# Patient Record
Sex: FEMALE | Race: WHITE | Employment: UNEMPLOYED | URBAN - METROPOLITAN AREA
[De-identification: names, ages, dates, MRNs, and addresses within clinical notes are randomized per-mention and may not be internally consistent; named-entity substitution may affect disease eponyms.]

---

## 2017-12-28 PROBLEM — M54.12 CERVICAL RADICULOPATHY: Status: ACTIVE | Noted: 2017-12-28

## 2018-11-05 ENCOUNTER — HOSPITAL ENCOUNTER (EMERGENCY)
Facility: CLINIC | Age: 48
Discharge: ANOTHER HOSPITAL | End: 2018-11-05
Attending: EMERGENCY MEDICINE | Admitting: EMERGENCY MEDICINE
Payer: MEDICARE

## 2018-11-05 ENCOUNTER — APPOINTMENT (OUTPATIENT)
Dept: GENERAL RADIOLOGY | Facility: CLINIC | Age: 48
End: 2018-11-05
Attending: EMERGENCY MEDICINE
Payer: MEDICARE

## 2018-11-05 ENCOUNTER — HOSPITAL ENCOUNTER (INPATIENT)
Facility: CLINIC | Age: 48
LOS: 3 days | Discharge: HOME | DRG: 625 | End: 2018-11-09
Attending: INTERNAL MEDICINE | Admitting: FAMILY MEDICINE
Payer: MEDICARE

## 2018-11-05 ENCOUNTER — APPOINTMENT (OUTPATIENT)
Dept: CT IMAGING | Facility: CLINIC | Age: 48
End: 2018-11-05
Attending: EMERGENCY MEDICINE
Payer: MEDICARE

## 2018-11-05 VITALS
RESPIRATION RATE: 24 BRPM | HEART RATE: 109 BPM | SYSTOLIC BLOOD PRESSURE: 145 MMHG | TEMPERATURE: 100.1 F | OXYGEN SATURATION: 94 % | WEIGHT: 293 LBS | DIASTOLIC BLOOD PRESSURE: 92 MMHG

## 2018-11-05 DIAGNOSIS — R13.10 DYSPHAGIA, UNSPECIFIED TYPE: ICD-10-CM

## 2018-11-05 DIAGNOSIS — J98.59 MEDIASTINAL MASS: Primary | ICD-10-CM

## 2018-11-05 DIAGNOSIS — J02.0 ACUTE STREPTOCOCCAL PHARYNGITIS: ICD-10-CM

## 2018-11-05 DIAGNOSIS — G47.33 OSA ON CPAP: ICD-10-CM

## 2018-11-05 DIAGNOSIS — A41.9 SEPSIS, DUE TO UNSPECIFIED ORGANISM: ICD-10-CM

## 2018-11-05 DIAGNOSIS — J98.59 MEDIASTINAL MASS: ICD-10-CM

## 2018-11-05 DIAGNOSIS — R13.10 ODYNOPHAGIA: ICD-10-CM

## 2018-11-05 DIAGNOSIS — R09.82 POSTNASAL DRIP: ICD-10-CM

## 2018-11-05 DIAGNOSIS — R49.0 HOARSENESS: ICD-10-CM

## 2018-11-05 LAB
ANION GAP SERPL CALCULATED.3IONS-SCNC: 6 MMOL/L (ref 3–14)
BASOPHILS # BLD AUTO: 0.1 10E9/L (ref 0–0.2)
BASOPHILS NFR BLD AUTO: 0.7 %
BUN SERPL-MCNC: 19 MG/DL (ref 7–30)
CALCIUM SERPL-MCNC: 8.7 MG/DL (ref 8.5–10.1)
CHLORIDE SERPL-SCNC: 102 MMOL/L (ref 94–109)
CO2 SERPL-SCNC: 29 MMOL/L (ref 20–32)
CREAT SERPL-MCNC: 0.85 MG/DL (ref 0.52–1.04)
DEPRECATED S PYO AG THROAT QL EIA: ABNORMAL
DIFFERENTIAL METHOD BLD: ABNORMAL
EOSINOPHIL # BLD AUTO: 0.4 10E9/L (ref 0–0.7)
EOSINOPHIL NFR BLD AUTO: 2.4 %
ERYTHROCYTE [DISTWIDTH] IN BLOOD BY AUTOMATED COUNT: 15.9 % (ref 10–15)
GFR SERPL CREATININE-BSD FRML MDRD: 71 ML/MIN/1.7M2
GLUCOSE SERPL-MCNC: 84 MG/DL (ref 70–99)
HCT VFR BLD AUTO: 42.5 % (ref 35–47)
HGB BLD-MCNC: 13.3 G/DL (ref 11.7–15.7)
IMM GRANULOCYTES # BLD: 0.3 10E9/L (ref 0–0.4)
IMM GRANULOCYTES NFR BLD: 2.1 %
LYMPHOCYTES # BLD AUTO: 2.5 10E9/L (ref 0.8–5.3)
LYMPHOCYTES NFR BLD AUTO: 15.7 %
MCH RBC QN AUTO: 28.2 PG (ref 26.5–33)
MCHC RBC AUTO-ENTMCNC: 31.3 G/DL (ref 31.5–36.5)
MCV RBC AUTO: 90 FL (ref 78–100)
MONOCYTES # BLD AUTO: 1.2 10E9/L (ref 0–1.3)
MONOCYTES NFR BLD AUTO: 7.2 %
NEUTROPHILS # BLD AUTO: 11.5 10E9/L (ref 1.6–8.3)
NEUTROPHILS NFR BLD AUTO: 71.9 %
NRBC # BLD AUTO: 0 10*3/UL
NRBC BLD AUTO-RTO: 0 /100
PLATELET # BLD AUTO: 330 10E9/L (ref 150–450)
POTASSIUM SERPL-SCNC: 3.9 MMOL/L (ref 3.4–5.3)
RBC # BLD AUTO: 4.72 10E12/L (ref 3.8–5.2)
SODIUM SERPL-SCNC: 137 MMOL/L (ref 133–144)
SPECIMEN SOURCE: ABNORMAL
WBC # BLD AUTO: 16 10E9/L (ref 4–11)

## 2018-11-05 PROCEDURE — 25000128 H RX IP 250 OP 636: Performed by: EMERGENCY MEDICINE

## 2018-11-05 PROCEDURE — 85025 COMPLETE CBC W/AUTO DIFF WBC: CPT | Performed by: EMERGENCY MEDICINE

## 2018-11-05 PROCEDURE — 80048 BASIC METABOLIC PNL TOTAL CA: CPT | Performed by: EMERGENCY MEDICINE

## 2018-11-05 PROCEDURE — A9270 NON-COVERED ITEM OR SERVICE: HCPCS | Mod: GY | Performed by: EMERGENCY MEDICINE

## 2018-11-05 PROCEDURE — 99285 EMERGENCY DEPT VISIT HI MDM: CPT | Mod: 25

## 2018-11-05 PROCEDURE — 71046 X-RAY EXAM CHEST 2 VIEWS: CPT

## 2018-11-05 PROCEDURE — 87880 STREP A ASSAY W/OPTIC: CPT | Performed by: EMERGENCY MEDICINE

## 2018-11-05 PROCEDURE — 96361 HYDRATE IV INFUSION ADD-ON: CPT

## 2018-11-05 PROCEDURE — 71260 CT THORAX DX C+: CPT

## 2018-11-05 PROCEDURE — 25000132 ZZH RX MED GY IP 250 OP 250 PS 637: Mod: GY | Performed by: EMERGENCY MEDICINE

## 2018-11-05 PROCEDURE — 96375 TX/PRO/DX INJ NEW DRUG ADDON: CPT

## 2018-11-05 PROCEDURE — 96365 THER/PROPH/DIAG IV INF INIT: CPT | Mod: 59

## 2018-11-05 RX ORDER — IOPAMIDOL 755 MG/ML
500 INJECTION, SOLUTION INTRAVASCULAR ONCE
Status: COMPLETED | OUTPATIENT
Start: 2018-11-05 | End: 2018-11-05

## 2018-11-05 RX ORDER — KETOROLAC TROMETHAMINE 15 MG/ML
15 INJECTION, SOLUTION INTRAMUSCULAR; INTRAVENOUS ONCE
Status: COMPLETED | OUTPATIENT
Start: 2018-11-05 | End: 2018-11-05

## 2018-11-05 RX ORDER — DEXAMETHASONE SODIUM PHOSPHATE 10 MG/ML
10 INJECTION, SOLUTION INTRAMUSCULAR; INTRAVENOUS ONCE
Status: COMPLETED | OUTPATIENT
Start: 2018-11-05 | End: 2018-11-05

## 2018-11-05 RX ORDER — AMOXICILLIN 500 MG/1
1000 CAPSULE ORAL ONCE
Status: COMPLETED | OUTPATIENT
Start: 2018-11-05 | End: 2018-11-05

## 2018-11-05 RX ADMIN — IOPAMIDOL 80 ML: 755 INJECTION, SOLUTION INTRAVENOUS at 17:31

## 2018-11-05 RX ADMIN — AMOXICILLIN 1000 MG: 500 CAPSULE ORAL at 16:34

## 2018-11-05 RX ADMIN — KETOROLAC TROMETHAMINE 15 MG: 15 INJECTION, SOLUTION INTRAMUSCULAR; INTRAVENOUS at 14:39

## 2018-11-05 RX ADMIN — TAZOBACTAM SODIUM AND PIPERACILLIN SODIUM 4.5 G: 500; 4 INJECTION, SOLUTION INTRAVENOUS at 19:15

## 2018-11-05 RX ADMIN — DEXAMETHASONE SODIUM PHOSPHATE 10 MG: 10 INJECTION, SOLUTION INTRAMUSCULAR; INTRAVENOUS at 14:39

## 2018-11-05 RX ADMIN — VANCOMYCIN HYDROCHLORIDE 3000 MG: 1 INJECTION, POWDER, LYOPHILIZED, FOR SOLUTION INTRAVENOUS at 19:47

## 2018-11-05 RX ADMIN — SODIUM CHLORIDE 1000 ML: 9 INJECTION, SOLUTION INTRAVENOUS at 14:40

## 2018-11-05 ASSESSMENT — ENCOUNTER SYMPTOMS
COUGH: 1
HEMATURIA: 0
NECK PAIN: 1
VOICE CHANGE: 1
SHORTNESS OF BREATH: 1
FEVER: 1
TROUBLE SWALLOWING: 1
DYSURIA: 0
SORE THROAT: 1
DIFFICULTY URINATING: 0
HEADACHES: 1
FREQUENCY: 0

## 2018-11-05 NOTE — ED NOTES
Patient able to tolerate oral medications but was having some issues drinking, she stated liquids are a bit tough but solids are fine, patient began coughing after drinking water

## 2018-11-05 NOTE — IP AVS SNAPSHOT
[address] [address]    Phone:  [phone]                                       After Visit Summary   11/5/2018    Carol Hurst    MRN: [id number]           After Visit Summary Signature Page     I have received my discharge instructions, and my questions have been answered. I have discussed any challenges I see with this plan with the nurse or doctor.    ..........................................................................................................................................  Patient/Patient Representative Signature      ..........................................................................................................................................  Patient Representative Print Name and Relationship to Patient    ..................................................               ................................................  Date                                   Time    ..........................................................................................................................................  Reviewed by Signature/Title    ...................................................              ..............................................  Date                                               Time          22EPIC Rev 08/18

## 2018-11-05 NOTE — ED TRIAGE NOTES
Patient presents to the ED reporting a sore throat and headache. Reports some difficulty swallowing. Denies SOB.

## 2018-11-05 NOTE — ED PROVIDER NOTES
History     Chief Complaint:  Pharyngitis and Headache    HPI   Carol Hurst is a 47 year old female who presents to the emergency department today with pharyngitis and headache. The patient reports sore throat, headache, difficulty swallowing, and has lost her voice. She reports cough, but also that she has mucus in her throat that makes it difficult to cough. Patient also has a fever of 100.1. She also reports neck pain and mild shortness of breath. She rates her pain as 6/10. She denies dysuria, hematuria, frequency, or difficulty urinating.     Allergies:  Ace Inhibitors  Haldol [Haloperidol]  Serotonin Reuptake Inhibitors  Thorazine [Chlorpromazine]    Medications:    Amlodipine  KCl  MVI      Past Medical History:    High blood pressure  Obesity      Past Surgical History:    History reviewed. No pertinent past surgical history.    Family History:    Noncontributory.    Social History:  The patient presents by herself.  Traveling by self.  Competitive eater.     Review of Systems   Constitutional: Positive for fever.   HENT: Positive for sore throat, trouble swallowing and voice change.    Respiratory: Positive for cough and shortness of breath.    Genitourinary: Negative for difficulty urinating, dysuria, frequency and hematuria.   Musculoskeletal: Positive for neck pain.   Neurological: Positive for headaches.   All other systems reviewed and are negative.    Physical Exam     Patient Vitals for the past 24 hrs:   BP Temp Temp src Pulse Resp SpO2   11/05/18 1530 151/85 - - - - 96 %   11/05/18 1515 152/81 - - - - 98 %   11/05/18 1445 (!) 145/96 - - - - 100 %   11/05/18 1419 - - - - - 100 %   11/05/18 1154 (!) 134/94 100.1  F (37.8  C) Temporal 109 24 96 %      Physical Exam  Nursing note and vitals reviewed.  Constitutional: Cooperative.   HENT:   Mouth/Throat: Moist mucous membranes. Posterior pharyngeal erythema.   Eyes: EOMI, nonicteric sclera  Cardiovascular: tachycardic, regular rhythm, no  murmurs, rubs, or gallops  Pulmonary/Chest: Increased effort. Only able to speak in incomplete sentences. Breath sounds normal. No stridor. No wheezes. No rales.   Abdominal: Soft. Nontender, nondistended, no guarding or rigidity. BS present.   Musculoskeletal: Normal range of motion.   Neurological: Alert. Moves all extremities spontaneously.   Skin: Skin is warm and dry. No rash noted.   Psychiatric: Normal mood and affect.         Emergency Department Course   Imaging:  Radiology findings were communicated with the patient who voiced understanding of the findings.  Chest XR, PA & LAT   IMPRESSION: Left superior mediastinal mass displacing and narrowing the trachea.  Report per radiology      Chest CT  1. Large left mediastinal mass that is ill-defined and immediately  adjacent to the esophagus measuring approximate 5.0 x 3.0 x 2.7 cm in  size. Differential diagnosis includes lymph node mass, esophageal  mass, or complex fluid collection/abscess.  2. Lungs are clear. No pleural or pericardial fluid.  Report per radiology      Laboratory:  Laboratory findings were communicated with the patient who voiced understanding of the findings.  CBC: AWNL (WBC 16.0(H), HGB 13.3, )  BMP: AWNL (Creatinine 0.85)   Rapid Strep Test: positive   Strep Culture: Pending     Interventions:  Medications   0.9% sodium chloride BOLUS (not administered)   piperacillin-tazobactam (ZOSYN) intermittent infusion 4.5 g (not administered)   vancomycin (VANCOCIN) 3,000 mg in sodium chloride 0.9 % 500 mL intermittent infusion (not administered)   dexamethasone PF (DECADRON) injection 10 mg (10 mg Intravenous Given 11/5/18 1439)   0.9% sodium chloride BOLUS (0 mLs Intravenous Stopped 11/5/18 1711)   ketorolac (TORADOL) injection 15 mg (15 mg Intravenous Given 11/5/18 1439)   amoxicillin (AMOXIL) capsule 1,000 mg (1,000 mg Oral Given 11/5/18 1634)   iopamidol (ISOVUE-370) solution 500 mL (80 mLs Intravenous Given 11/5/18 1731)         Emergency Department Course:  Nursing notes and vitals reviewed.  1325: I performed an exam of the patient as documented above.   IV was inserted and blood was drawn for laboratory testing, results above.  The patient was sent for a Chest XR and Chest CT while in the emergency department, results above.   1620: I spoke with Dr. Mercer of the Thoracic Surgery service regarding patient's presentation, findings, and plan of care.   1756: Rediscussed with Dr. Mercer. Given CT reading - recommending transfer to [place].   I personally reviewed the laboratory and imaging results with the Patient and answered all related questions prior to transfer.     Impression & Plan    Medical Decision Making:  Patient presents with multiple complaints including sore throat, fever, hoarse voice.  Workup was notable for leukocytosis, positive rapid strep, as well as a mediastinal mass noted on chest x-ray.  Follow-up CT performed which is suggestive of abscess versus lymph node mass versus esophageal mass.  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  Patient initially received amoxicillin when rapid strep became positive, this was later broadened to vancomycin and Zosyn.  Discussed with thoracic surgery who recommended transfer to the [place] for definitive care.  Notably, patient had initially been quite insistent on leaving [facility] and returning to [place] where she is from.  I had several long discussions with her and with the help of additional staff, was able to encourage her to stay.  Dr. Zavala from the [place] accepts for transfer.    Diagnosis:    ICD-10-CM    1. Mediastinal mass J98.59     likely abscess   2. Sepsis, due to unspecified organism (H) A41.9    3. Acute streptococcal pharyngitis J02.0      Disposition:  Transferred to [place].     Scribe Disclosure:  Maria G ROWELL, am serving as a  scribe at 1:25 PM on 11/5/2018 to document services personally performed by Pool Lassiter MD based on my observations and the provider's statements to me.    11/5/2018   [facility] EMERGENCY DEPARTMENT       Pool Lassiter MD  11/05/18 1957

## 2018-11-05 NOTE — LETTER
Transition Communication Hand-off for Care Transitions to Next Level of Care Provider    Name: Carol Hurst  : 1970  MRN #: [id number]  Primary Care Provider: Elizabeth Willingham  Primary Clinic: [address], [facility] [address]     Reason for Hospitalization:    mediastinal mass  obstructed trachea  Esophageal Perforation     Admit Date/Time: 2018 10:22 PM  Discharge Date: 2018    Payor Source: Payor: MEDICARE / Plan: MEDICARE / Product Type: Medicare /         Reason for Communication Hand-off Referral: Multiple providers/specialties  Other Patient will need assistance with follow up Speciality Physician Services   Patient needs arrangements with Speech Therapy in [place]    Discharge Plan: Patient is driving home from [facility], leaving today.  Follow-up plan:  Initial appointments are arranged, not available in EPIC    Any outstanding tests or procedures:        Referrals     Future Labs/Procedures    Speech Therapy Referral     Process Instructions:    *This therapy referral will be filtered to a centralized scheduling office at [facility] and the patient will receive a call to schedule an appointment at a [place] location most convenient for them. *    Comments:    If you have not heard from the scheduling office within 2 business days, please call [phone] for all locations, with the exception of [place], please call [phone] and [place], please call [phone].    Please be aware that coverage of these services is subject to the terms and limitations of your health insurance plan.  Call member services at your health plan with any benefit or coverage questions.          Key Recommendations:   Please review discharge after visit summary    Nathalia Garner RN, BSN, PHN  Medicine Care Coordinator  [facility], [facility]  Desk Phone: [phone]    AVS/Discharge Summary is the source of truth; this is a  helpful guide for improved communication of patient story

## 2018-11-05 NOTE — IP AVS SNAPSHOT
MRN:[id number]                      After Visit Summary   11/5/2018    Carol Hurst    MRN: [id number]           Thank you!     Thank you for choosing [facility] for your care. Our goal is always to provide you with excellent care. Hearing back from our patients is one way we can continue to improve our services. Please take a few minutes to complete the written survey that you may receive in the mail after you visit with us. Thank you!        Patient Information     Date Of Birth          1970        Designated Caregiver       Most Recent Value    Caregiver    Will someone help with your care after discharge? no      About your hospital stay     You were admitted on:  November 5, 2018 You last received care in the:  [address] [facility]    You were discharged on:  November 9, 2018        Reason for your hospital stay       You were hospitalized after you developed a hoarse voice and some difficulty swallowing liquids. We did some imaging which revealed a mass in your neck, which is likely thyroid in origin. We recommend follow up with your hematologist and primary doctor on return to [place] for further evaluation and treatment.                  Who to Call     For medical emergencies, please call 911.  For non-urgent questions about your medical care, please call your primary care provider or clinic, [phone]  For questions related to your surgery, please call your surgery clinic        Attending Provider     Provider Specialty    Bia Zavala DO Internal Medicine    Ankush Fuller MD Family Practice       Primary Care Provider Office Phone # Fax #    Elizabeth Willingham [phone]       When to contact your care team       Call your primary doctor if you have any of the following:  increased shortness of breath, increased swelling or increased pain.                  After Care Instructions     Activity       Your activity upon discharge: activity as  tolerated            Diet       Follow this diet upon discharge: Orders Placed This Encounter        High Kcal/High Protein Diet, ADULT                  Follow-up Appointments     Follow Up and recommended labs and tests       Follow up with primary care provider, Elizabeth Willingham, within 7 days regarding new diagnosis.  No follow up labs or test are needed.  Follow up with your hematologist to follow up on your pathology report and for further treatment.                  Additional Services     Speech Therapy Referral       If you have not heard from the scheduling office within 2 business days, please call [phone] for all locations, with the exception of [place], please call [phone] and [place], please call [phone].    Please be aware that coverage of these services is subject to the terms and limitations of your health insurance plan.  Call member services at your health plan with any benefit or coverage questions.                  Additional Information     If you use hormonal birth control (such as the pill, patch, ring or implants): You'll need a second form of birth control for 7 days (condoms, a diaphragm or contraceptive foam). While in the hospital, you received a medicine called Bridion. Your normal birth control will not work as well for a week after taking this medicine.          Pending Results     Date and Time Order Name Status Description    11/6/2018 1300 Surgical pathology exam Preliminary     11/6/2018 1236 Fungus Culture, non-blood Preliminary     11/6/2018 1236 Anaerobic bacterial culture Preliminary             Statement of Approval     Ordered          11/09/18 1020  I have reviewed and agree with all the recommendations and orders detailed in this document.  EFFECTIVE NOW     Approved and electronically signed by:  Chris Fitzgerald MD             Admission Information     Date & Time Provider Department Dept. Phone    11/5/2018 Ankush Fuller MD [address] [facility]  "[place] [phone]      Your Vitals Were     Blood Pressure Pulse Temperature Respirations Height Weight    168/98 (BP Location: Left arm) 100 98.8  F (37.1  C) (Oral) 18 1.803 m (5' 11\") 200 kg (441 lb)    Pulse Oximetry BMI (Body Mass Index)                94% 61.51 kg/m2          [facility] Information     [facility] lets you send messages to your doctor, view your test results, renew your prescriptions, schedule appointments and more. To sign up, go to [email or web address] . Click on \"Log in\" on the left side of the screen, which will take you to the Welcome page. Then click on \"Sign up Now\" on the right side of the page.     You will be asked to enter the access code listed below, as well as some personal information. Please follow the directions to create your username and password.     Your access code is: [email or web address]  Expires: 2019 12:09 PM     Your access code will  in 90 days. If you need help or a new code, please call your [facility] clinic or [phone].        Care EveryWhere ID     This is your Care EveryWhere ID. This could be used by other organizations to access your [facility] medical records  [id number]        Equal Access to Services     JOELLEN PEREZ AH: Austen nava Soartem, waaxda luqadaha, qaybta kaalmada adeegyada, monse awad. So [facility] [phone].    ATENCIÓN: Si habla español, tiene a gil disposición servicios gratuitos de asistencia lingüística. Llame al [phone].    We comply with applicable federal civil rights laws and [place] laws. We do not discriminate on the basis of race, color, national origin, age, disability, sex, sexual orientation, or gender identity.               Review of your medicines      START taking        Dose / Directions    Acidophilus Lactobacillus Caps        Dose:  1 capsule   Take 1 capsule by mouth 2 times daily (before meals)   Quantity:  60 capsule   Refills:  0       fluticasone 50 MCG/ACT spray "   Commonly known as:  FLONASE   Used for:  Dysphagia, unspecified type, Postnasal drip        Dose:  1-2 spray   Spray 1-2 sprays into both nostrils daily   Quantity:  1 Bottle   Refills:  11       STARCH-MALTO DEXTRIN Powd   Commonly known as:  [employer] INSTANT FOOD THICKENER   Used for:  Dysphagia, unspecified type        Dose:  1 packet   Take 1 packet by mouth as needed (liquid thickening)   Quantity:  1020 g   Refills:  0         CONTINUE these medicines which have NOT CHANGED        Dose / Directions    * allopurinol 100 MG tablet   Commonly known as:  ZYLOPRIM        Dose:  200 mg   Take 200 mg by mouth daily Take with 300 mg tablet.   Refills:  0       * allopurinol 300 MG tablet   Commonly known as:  ZYLOPRIM        Dose:  300 mg   Take 300 mg by mouth daily Take with two 100 mg tablets.   Refills:  0       amLODIPine 5 MG tablet   Commonly known as:  NORVASC        Dose:  5 mg   Take 5 mg by mouth daily   Refills:  0       aspirin 325 MG tablet        Dose:  325 mg   Take 325 mg by mouth daily   Refills:  0       cholecalciferol 2000 units Caps        Dose:  1 capsule   Take 1 capsule by mouth daily   Refills:  0       naproxen 500 MG tablet   Commonly known as:  NAPROSYN        Dose:  500 mg   Take 500 mg by mouth 2 times daily as needed for moderate pain   Refills:  0       potassium citrate 15 MEQ (1620 MG) Tbcr        Dose:  1 tablet   Take 1 tablet by mouth 2 times daily   Refills:  0       * Notice:  This list has 2 medication(s) that are the same as other medications prescribed for you. Read the directions carefully, and ask your doctor or other care provider to review them with you.         Where to get your medicines      These medications were sent to [facility] - [place], [place] - [address]  [address] [address]     Phone:  [phone]     Acidophilus Lactobacillus Caps    fluticasone 50 MCG/ACT spray    STARCH-MALTO DEXTRIN Powd                 Protect others around you: Learn how to safely use, store and throw away your medicines at www.disposemymeds.org.             Medication List: This is a list of all your medications and when to take them. Check marks below indicate your daily home schedule. Keep this list as a reference.      Medications           Morning Afternoon Evening Bedtime As Needed    Acidophilus Lactobacillus Caps   Take 1 capsule by mouth 2 times daily (before meals)                                * allopurinol 100 MG tablet   Commonly known as:  ZYLOPRIM   Take 200 mg by mouth daily Take with 300 mg tablet.   Last time this was given:  11/9/2018 10:04 AM                                * allopurinol 300 MG tablet   Commonly known as:  ZYLOPRIM   Take 300 mg by mouth daily Take with two 100 mg tablets.   Last time this was given:  11/9/2018 10:04 AM                                amLODIPine 5 MG tablet   Commonly known as:  NORVASC   Take 5 mg by mouth daily   Last time this was given:  5 mg on 11/9/2018  9:09 AM                                aspirin 325 MG tablet   Take 325 mg by mouth daily   Last time this was given:  325 mg on 11/9/2018  9:09 AM                                cholecalciferol 2000 units Caps   Take 1 capsule by mouth daily                                fluticasone 50 MCG/ACT spray   Commonly known as:  FLONASE   Spray 1-2 sprays into both nostrils daily                                naproxen 500 MG tablet   Commonly known as:  NAPROSYN   Take 500 mg by mouth 2 times daily as needed for moderate pain                                potassium citrate 15 MEQ (1620 MG) Tbcr   Take 1 tablet by mouth 2 times daily                                STARCH-MALTO DEXTRIN Powd   Commonly known as:  [employer] INSTANT FOOD THICKENER   Take 1 packet by mouth as needed (liquid thickening)                                * Notice:  This list has 2 medication(s) that are the same as other medications prescribed for you. Read the directions  carefully, and ask your doctor or other care provider to review them with you.

## 2018-11-06 ENCOUNTER — ANESTHESIA EVENT (OUTPATIENT)
Dept: SURGERY | Facility: CLINIC | Age: 48
DRG: 625 | End: 2018-11-06
Payer: MEDICARE

## 2018-11-06 ENCOUNTER — ANESTHESIA (OUTPATIENT)
Dept: SURGERY | Facility: CLINIC | Age: 48
DRG: 625 | End: 2018-11-06
Payer: MEDICARE

## 2018-11-06 PROBLEM — J98.59 MEDIASTINAL MASS: Status: ACTIVE | Noted: 2018-11-06

## 2018-11-06 LAB
ABO + RH BLD: NORMAL
ABO + RH BLD: NORMAL
ANION GAP SERPL CALCULATED.3IONS-SCNC: 11 MMOL/L (ref 3–14)
BASOPHILS # BLD AUTO: 0 10E9/L (ref 0–0.2)
BASOPHILS NFR BLD AUTO: 0.2 %
BLD GP AB SCN SERPL QL: NORMAL
BLOOD BANK CMNT PATIENT-IMP: NORMAL
BUN SERPL-MCNC: 21 MG/DL (ref 7–30)
CALCIUM SERPL-MCNC: 8.7 MG/DL (ref 8.5–10.1)
CHLORIDE SERPL-SCNC: 104 MMOL/L (ref 94–109)
CO2 SERPL-SCNC: 23 MMOL/L (ref 20–32)
CREAT SERPL-MCNC: 0.86 MG/DL (ref 0.52–1.04)
DIFFERENTIAL METHOD BLD: ABNORMAL
EOSINOPHIL # BLD AUTO: 0 10E9/L (ref 0–0.7)
EOSINOPHIL NFR BLD AUTO: 0 %
ERYTHROCYTE [DISTWIDTH] IN BLOOD BY AUTOMATED COUNT: 16 % (ref 10–15)
GFR SERPL CREATININE-BSD FRML MDRD: 70 ML/MIN/1.7M2
GLUCOSE BLDC GLUCOMTR-MCNC: 110 MG/DL (ref 70–99)
GLUCOSE SERPL-MCNC: 154 MG/DL (ref 70–99)
HCT VFR BLD AUTO: 42.5 % (ref 35–47)
HGB BLD-MCNC: 13.1 G/DL (ref 11.7–15.7)
IMM GRANULOCYTES # BLD: 0.2 10E9/L (ref 0–0.4)
IMM GRANULOCYTES NFR BLD: 1 %
LACTATE BLD-SCNC: 1.3 MMOL/L (ref 0.7–2)
LYMPHOCYTES # BLD AUTO: 1.4 10E9/L (ref 0.8–5.3)
LYMPHOCYTES NFR BLD AUTO: 8.8 %
MCH RBC QN AUTO: 28.4 PG (ref 26.5–33)
MCHC RBC AUTO-ENTMCNC: 30.8 G/DL (ref 31.5–36.5)
MCV RBC AUTO: 92 FL (ref 78–100)
MONOCYTES # BLD AUTO: 0.4 10E9/L (ref 0–1.3)
MONOCYTES NFR BLD AUTO: 2.3 %
NEUTROPHILS # BLD AUTO: 14 10E9/L (ref 1.6–8.3)
NEUTROPHILS NFR BLD AUTO: 87.7 %
NRBC # BLD AUTO: 0 10*3/UL
NRBC BLD AUTO-RTO: 0 /100
PLATELET # BLD AUTO: 340 10E9/L (ref 150–450)
POTASSIUM SERPL-SCNC: 4.5 MMOL/L (ref 3.4–5.3)
RBC # BLD AUTO: 4.61 10E12/L (ref 3.8–5.2)
SODIUM SERPL-SCNC: 138 MMOL/L (ref 133–144)
SPECIMEN EXP DATE BLD: NORMAL
WBC # BLD AUTO: 16 10E9/L (ref 4–11)

## 2018-11-06 PROCEDURE — 88305 TISSUE EXAM BY PATHOLOGIST: CPT | Performed by: THORACIC SURGERY (CARDIOTHORACIC VASCULAR SURGERY)

## 2018-11-06 PROCEDURE — 00000146 ZZHCL STATISTIC GLUCOSE BY METER IP

## 2018-11-06 PROCEDURE — 85025 COMPLETE CBC W/AUTO DIFF WBC: CPT | Performed by: INTERNAL MEDICINE

## 2018-11-06 PROCEDURE — 25000128 H RX IP 250 OP 636: Performed by: NURSE ANESTHETIST, CERTIFIED REGISTERED

## 2018-11-06 PROCEDURE — 36000062 ZZH SURGERY LEVEL 4 1ST 30 MIN - UMMC: Performed by: THORACIC SURGERY (CARDIOTHORACIC VASCULAR SURGERY)

## 2018-11-06 PROCEDURE — 0BJ08ZZ INSPECTION OF TRACHEOBRONCHIAL TREE, VIA NATURAL OR ARTIFICIAL OPENING ENDOSCOPIC: ICD-10-PCS | Performed by: THORACIC SURGERY (CARDIOTHORACIC VASCULAR SURGERY)

## 2018-11-06 PROCEDURE — 71000015 ZZH RECOVERY PHASE 1 LEVEL 2 EA ADDTL HR: Performed by: THORACIC SURGERY (CARDIOTHORACIC VASCULAR SURGERY)

## 2018-11-06 PROCEDURE — 87070 CULTURE OTHR SPECIMN AEROBIC: CPT | Performed by: THORACIC SURGERY (CARDIOTHORACIC VASCULAR SURGERY)

## 2018-11-06 PROCEDURE — 25000565 ZZH ISOFLURANE, EA 15 MIN: Performed by: THORACIC SURGERY (CARDIOTHORACIC VASCULAR SURGERY)

## 2018-11-06 PROCEDURE — 87077 CULTURE AEROBIC IDENTIFY: CPT | Performed by: THORACIC SURGERY (CARDIOTHORACIC VASCULAR SURGERY)

## 2018-11-06 PROCEDURE — 12000006 ZZH R&B IMCU INTERMEDIATE UMMC

## 2018-11-06 PROCEDURE — 25000128 H RX IP 250 OP 636: Performed by: STUDENT IN AN ORGANIZED HEALTH CARE EDUCATION/TRAINING PROGRAM

## 2018-11-06 PROCEDURE — C1894 INTRO/SHEATH, NON-LASER: HCPCS | Performed by: THORACIC SURGERY (CARDIOTHORACIC VASCULAR SURGERY)

## 2018-11-06 PROCEDURE — 0GBJ0ZX EXCISION OF THYROID GLAND ISTHMUS, OPEN APPROACH, DIAGNOSTIC: ICD-10-PCS | Performed by: THORACIC SURGERY (CARDIOTHORACIC VASCULAR SURGERY)

## 2018-11-06 PROCEDURE — 86900 BLOOD TYPING SEROLOGIC ABO: CPT | Performed by: ANESTHESIOLOGY

## 2018-11-06 PROCEDURE — 88342 IMHCHEM/IMCYTCHM 1ST ANTB: CPT | Performed by: THORACIC SURGERY (CARDIOTHORACIC VASCULAR SURGERY)

## 2018-11-06 PROCEDURE — 36415 COLL VENOUS BLD VENIPUNCTURE: CPT | Performed by: INTERNAL MEDICINE

## 2018-11-06 PROCEDURE — 87102 FUNGUS ISOLATION CULTURE: CPT | Performed by: THORACIC SURGERY (CARDIOTHORACIC VASCULAR SURGERY)

## 2018-11-06 PROCEDURE — 25000128 H RX IP 250 OP 636: Performed by: ANESTHESIOLOGY

## 2018-11-06 PROCEDURE — 87076 CULTURE ANAEROBE IDENT EACH: CPT | Performed by: THORACIC SURGERY (CARDIOTHORACIC VASCULAR SURGERY)

## 2018-11-06 PROCEDURE — 80048 BASIC METABOLIC PNL TOTAL CA: CPT | Performed by: FAMILY MEDICINE

## 2018-11-06 PROCEDURE — 86901 BLOOD TYPING SEROLOGIC RH(D): CPT | Performed by: ANESTHESIOLOGY

## 2018-11-06 PROCEDURE — 25000132 ZZH RX MED GY IP 250 OP 250 PS 637: Mod: GY | Performed by: STUDENT IN AN ORGANIZED HEALTH CARE EDUCATION/TRAINING PROGRAM

## 2018-11-06 PROCEDURE — 25000125 ZZHC RX 250: Performed by: NURSE ANESTHETIST, CERTIFIED REGISTERED

## 2018-11-06 PROCEDURE — 25000128 H RX IP 250 OP 636: Performed by: THORACIC SURGERY (CARDIOTHORACIC VASCULAR SURGERY)

## 2018-11-06 PROCEDURE — 37000008 ZZH ANESTHESIA TECHNICAL FEE, 1ST 30 MIN: Performed by: THORACIC SURGERY (CARDIOTHORACIC VASCULAR SURGERY)

## 2018-11-06 PROCEDURE — 37000009 ZZH ANESTHESIA TECHNICAL FEE, EACH ADDTL 15 MIN: Performed by: THORACIC SURGERY (CARDIOTHORACIC VASCULAR SURGERY)

## 2018-11-06 PROCEDURE — 71000014 ZZH RECOVERY PHASE 1 LEVEL 2 FIRST HR: Performed by: THORACIC SURGERY (CARDIOTHORACIC VASCULAR SURGERY)

## 2018-11-06 PROCEDURE — 25000128 H RX IP 250 OP 636: Performed by: FAMILY MEDICINE

## 2018-11-06 PROCEDURE — 85049 AUTOMATED PLATELET COUNT: CPT | Performed by: FAMILY MEDICINE

## 2018-11-06 PROCEDURE — 86850 RBC ANTIBODY SCREEN: CPT | Performed by: ANESTHESIOLOGY

## 2018-11-06 PROCEDURE — 87075 CULTR BACTERIA EXCEPT BLOOD: CPT | Performed by: THORACIC SURGERY (CARDIOTHORACIC VASCULAR SURGERY)

## 2018-11-06 PROCEDURE — 27210794 ZZH OR GENERAL SUPPLY STERILE: Performed by: THORACIC SURGERY (CARDIOTHORACIC VASCULAR SURGERY)

## 2018-11-06 PROCEDURE — 87186 SC STD MICRODIL/AGAR DIL: CPT | Performed by: THORACIC SURGERY (CARDIOTHORACIC VASCULAR SURGERY)

## 2018-11-06 PROCEDURE — A9270 NON-COVERED ITEM OR SERVICE: HCPCS | Mod: GY | Performed by: STUDENT IN AN ORGANIZED HEALTH CARE EDUCATION/TRAINING PROGRAM

## 2018-11-06 PROCEDURE — 25000125 ZZHC RX 250: Performed by: ANESTHESIOLOGY

## 2018-11-06 PROCEDURE — 36000064 ZZH SURGERY LEVEL 4 EA 15 ADDTL MIN - UMMC: Performed by: THORACIC SURGERY (CARDIOTHORACIC VASCULAR SURGERY)

## 2018-11-06 PROCEDURE — 83605 ASSAY OF LACTIC ACID: CPT | Performed by: INTERNAL MEDICINE

## 2018-11-06 PROCEDURE — 0DJ08ZZ INSPECTION OF UPPER INTESTINAL TRACT, VIA NATURAL OR ARTIFICIAL OPENING ENDOSCOPIC: ICD-10-PCS | Performed by: THORACIC SURGERY (CARDIOTHORACIC VASCULAR SURGERY)

## 2018-11-06 PROCEDURE — 40000556 ZZH STATISTIC PERIPHERAL IV START W US GUIDANCE

## 2018-11-06 PROCEDURE — 88331 PATH CONSLTJ SURG 1 BLK 1SPC: CPT | Performed by: THORACIC SURGERY (CARDIOTHORACIC VASCULAR SURGERY)

## 2018-11-06 PROCEDURE — 0GBG0ZX EXCISION OF LEFT THYROID GLAND LOBE, OPEN APPROACH, DIAGNOSTIC: ICD-10-PCS | Performed by: THORACIC SURGERY (CARDIOTHORACIC VASCULAR SURGERY)

## 2018-11-06 PROCEDURE — 40000171 ZZH STATISTIC PRE-PROCEDURE ASSESSMENT III: Performed by: THORACIC SURGERY (CARDIOTHORACIC VASCULAR SURGERY)

## 2018-11-06 RX ORDER — ONDANSETRON 2 MG/ML
INJECTION INTRAMUSCULAR; INTRAVENOUS PRN
Status: DISCONTINUED | OUTPATIENT
Start: 2018-11-06 | End: 2018-11-06

## 2018-11-06 RX ORDER — DEXAMETHASONE SODIUM PHOSPHATE 10 MG/ML
INJECTION, SOLUTION INTRAMUSCULAR; INTRAVENOUS PRN
Status: DISCONTINUED | OUTPATIENT
Start: 2018-11-06 | End: 2018-11-06

## 2018-11-06 RX ORDER — POTASSIUM CHLORIDE 750 MG/1
20-40 TABLET, EXTENDED RELEASE ORAL
Status: DISCONTINUED | OUTPATIENT
Start: 2018-11-06 | End: 2018-11-09 | Stop reason: HOSPADM

## 2018-11-06 RX ORDER — PIPERACILLIN SODIUM, TAZOBACTAM SODIUM 4; .5 G/20ML; G/20ML
4.5 INJECTION, POWDER, LYOPHILIZED, FOR SOLUTION INTRAVENOUS EVERY 6 HOURS
Status: DISCONTINUED | OUTPATIENT
Start: 2018-11-06 | End: 2018-11-06

## 2018-11-06 RX ORDER — SODIUM CHLORIDE, SODIUM LACTATE, POTASSIUM CHLORIDE, CALCIUM CHLORIDE 600; 310; 30; 20 MG/100ML; MG/100ML; MG/100ML; MG/100ML
INJECTION, SOLUTION INTRAVENOUS CONTINUOUS
Status: DISCONTINUED | OUTPATIENT
Start: 2018-11-06 | End: 2018-11-06 | Stop reason: HOSPADM

## 2018-11-06 RX ORDER — MAGNESIUM SULFATE HEPTAHYDRATE 40 MG/ML
4 INJECTION, SOLUTION INTRAVENOUS EVERY 4 HOURS PRN
Status: DISCONTINUED | OUTPATIENT
Start: 2018-11-06 | End: 2018-11-09 | Stop reason: HOSPADM

## 2018-11-06 RX ORDER — BISACODYL 10 MG
10 SUPPOSITORY, RECTAL RECTAL DAILY PRN
Status: DISCONTINUED | OUTPATIENT
Start: 2018-11-06 | End: 2018-11-09 | Stop reason: HOSPADM

## 2018-11-06 RX ORDER — SODIUM CHLORIDE, SODIUM LACTATE, POTASSIUM CHLORIDE, CALCIUM CHLORIDE 600; 310; 30; 20 MG/100ML; MG/100ML; MG/100ML; MG/100ML
INJECTION, SOLUTION INTRAVENOUS CONTINUOUS PRN
Status: DISCONTINUED | OUTPATIENT
Start: 2018-11-06 | End: 2018-11-06

## 2018-11-06 RX ORDER — POTASSIUM CHLORIDE 1.5 G/1.58G
20-40 POWDER, FOR SOLUTION ORAL
Status: DISCONTINUED | OUTPATIENT
Start: 2018-11-06 | End: 2018-11-09 | Stop reason: HOSPADM

## 2018-11-06 RX ORDER — LIDOCAINE 40 MG/G
CREAM TOPICAL
Status: DISCONTINUED | OUTPATIENT
Start: 2018-11-06 | End: 2018-11-06 | Stop reason: HOSPADM

## 2018-11-06 RX ORDER — SODIUM CHLORIDE AND POTASSIUM CHLORIDE 150; 900 MG/100ML; MG/100ML
INJECTION, SOLUTION INTRAVENOUS CONTINUOUS
Status: DISCONTINUED | OUTPATIENT
Start: 2018-11-06 | End: 2018-11-06

## 2018-11-06 RX ORDER — NALOXONE HYDROCHLORIDE 0.4 MG/ML
.1-.4 INJECTION, SOLUTION INTRAMUSCULAR; INTRAVENOUS; SUBCUTANEOUS
Status: ACTIVE | OUTPATIENT
Start: 2018-11-06 | End: 2018-11-07

## 2018-11-06 RX ORDER — CEFTRIAXONE 1 G/1
1 INJECTION, POWDER, FOR SOLUTION INTRAMUSCULAR; INTRAVENOUS EVERY 12 HOURS
Status: DISCONTINUED | OUTPATIENT
Start: 2018-11-06 | End: 2018-11-07

## 2018-11-06 RX ORDER — SODIUM CHLORIDE 9 MG/ML
INJECTION, SOLUTION INTRAVENOUS CONTINUOUS PRN
Status: DISCONTINUED | OUTPATIENT
Start: 2018-11-06 | End: 2018-11-06

## 2018-11-06 RX ORDER — DEXMEDETOMIDINE HYDROCHLORIDE 4 UG/ML
0.2-1.2 INJECTION, SOLUTION INTRAVENOUS CONTINUOUS
Status: DISCONTINUED | OUTPATIENT
Start: 2018-11-06 | End: 2018-11-06 | Stop reason: HOSPADM

## 2018-11-06 RX ORDER — AMOXICILLIN 250 MG
2 CAPSULE ORAL 2 TIMES DAILY PRN
Status: DISCONTINUED | OUTPATIENT
Start: 2018-11-06 | End: 2018-11-09 | Stop reason: HOSPADM

## 2018-11-06 RX ORDER — ONDANSETRON 4 MG/1
4 TABLET, ORALLY DISINTEGRATING ORAL EVERY 30 MIN PRN
Status: DISCONTINUED | OUTPATIENT
Start: 2018-11-06 | End: 2018-11-06 | Stop reason: HOSPADM

## 2018-11-06 RX ORDER — ACETAMINOPHEN 325 MG/1
650 TABLET ORAL EVERY 4 HOURS PRN
Status: DISCONTINUED | OUTPATIENT
Start: 2018-11-06 | End: 2018-11-09 | Stop reason: HOSPADM

## 2018-11-06 RX ORDER — BUPIVACAINE HYDROCHLORIDE 2.5 MG/ML
INJECTION, SOLUTION INFILTRATION; PERINEURAL PRN
Status: DISCONTINUED | OUTPATIENT
Start: 2018-11-06 | End: 2018-11-06 | Stop reason: HOSPADM

## 2018-11-06 RX ORDER — NALOXONE HYDROCHLORIDE 0.4 MG/ML
.1-.4 INJECTION, SOLUTION INTRAMUSCULAR; INTRAVENOUS; SUBCUTANEOUS
Status: DISCONTINUED | OUTPATIENT
Start: 2018-11-06 | End: 2018-11-09 | Stop reason: HOSPADM

## 2018-11-06 RX ORDER — FENTANYL CITRATE 50 UG/ML
INJECTION, SOLUTION INTRAMUSCULAR; INTRAVENOUS PRN
Status: DISCONTINUED | OUTPATIENT
Start: 2018-11-06 | End: 2018-11-06

## 2018-11-06 RX ORDER — ONDANSETRON 4 MG/1
4 TABLET, ORALLY DISINTEGRATING ORAL EVERY 6 HOURS PRN
Status: DISCONTINUED | OUTPATIENT
Start: 2018-11-06 | End: 2018-11-09 | Stop reason: HOSPADM

## 2018-11-06 RX ORDER — PROPOFOL 10 MG/ML
INJECTION, EMULSION INTRAVENOUS PRN
Status: DISCONTINUED | OUTPATIENT
Start: 2018-11-06 | End: 2018-11-06

## 2018-11-06 RX ORDER — POLYETHYLENE GLYCOL 3350 17 G/17G
17 POWDER, FOR SOLUTION ORAL DAILY PRN
Status: DISCONTINUED | OUTPATIENT
Start: 2018-11-06 | End: 2018-11-09 | Stop reason: HOSPADM

## 2018-11-06 RX ORDER — HYDROMORPHONE HYDROCHLORIDE 1 MG/ML
.3-.5 INJECTION, SOLUTION INTRAMUSCULAR; INTRAVENOUS; SUBCUTANEOUS EVERY 5 MIN PRN
Status: DISCONTINUED | OUTPATIENT
Start: 2018-11-06 | End: 2018-11-06 | Stop reason: HOSPADM

## 2018-11-06 RX ORDER — ASPIRIN 325 MG
325 TABLET ORAL DAILY
Status: DISCONTINUED | OUTPATIENT
Start: 2018-11-06 | End: 2018-11-09 | Stop reason: HOSPADM

## 2018-11-06 RX ORDER — FENTANYL CITRATE 50 UG/ML
25-50 INJECTION, SOLUTION INTRAMUSCULAR; INTRAVENOUS
Status: DISCONTINUED | OUTPATIENT
Start: 2018-11-06 | End: 2018-11-06 | Stop reason: HOSPADM

## 2018-11-06 RX ORDER — NAPROXEN 500 MG/1
500 TABLET ORAL
Status: DISCONTINUED | OUTPATIENT
Start: 2018-11-06 | End: 2018-11-09 | Stop reason: HOSPADM

## 2018-11-06 RX ORDER — LIDOCAINE 40 MG/G
CREAM TOPICAL
Status: DISCONTINUED | OUTPATIENT
Start: 2018-11-06 | End: 2018-11-09 | Stop reason: HOSPADM

## 2018-11-06 RX ORDER — ONDANSETRON 2 MG/ML
4 INJECTION INTRAMUSCULAR; INTRAVENOUS EVERY 30 MIN PRN
Status: DISCONTINUED | OUTPATIENT
Start: 2018-11-06 | End: 2018-11-06 | Stop reason: HOSPADM

## 2018-11-06 RX ORDER — POTASSIUM CITRATE AND CITRIC ACID MONOHYDRATE 1100; 334 MG/5ML; MG/5ML
15 SOLUTION ORAL 2 TIMES DAILY
Status: DISCONTINUED | OUTPATIENT
Start: 2018-11-06 | End: 2018-11-06

## 2018-11-06 RX ORDER — AMOXICILLIN 250 MG
1 CAPSULE ORAL 2 TIMES DAILY PRN
Status: DISCONTINUED | OUTPATIENT
Start: 2018-11-06 | End: 2018-11-09 | Stop reason: HOSPADM

## 2018-11-06 RX ORDER — AMLODIPINE BESYLATE 5 MG/1
5 TABLET ORAL DAILY
Status: DISCONTINUED | OUTPATIENT
Start: 2018-11-06 | End: 2018-11-09 | Stop reason: HOSPADM

## 2018-11-06 RX ORDER — ACETAMINOPHEN 650 MG/1
650 SUPPOSITORY RECTAL EVERY 4 HOURS PRN
Status: DISCONTINUED | OUTPATIENT
Start: 2018-11-06 | End: 2018-11-06

## 2018-11-06 RX ORDER — POTASSIUM CHLORIDE 29.8 MG/ML
20 INJECTION INTRAVENOUS
Status: DISCONTINUED | OUTPATIENT
Start: 2018-11-06 | End: 2018-11-09 | Stop reason: HOSPADM

## 2018-11-06 RX ORDER — HYDROCODONE BITARTRATE AND ACETAMINOPHEN 5; 325 MG/1; MG/1
1 TABLET ORAL EVERY 4 HOURS PRN
Status: DISCONTINUED | OUTPATIENT
Start: 2018-11-06 | End: 2018-11-09 | Stop reason: HOSPADM

## 2018-11-06 RX ORDER — POTASSIUM CL/LIDO/0.9 % NACL 10MEQ/0.1L
10 INTRAVENOUS SOLUTION, PIGGYBACK (ML) INTRAVENOUS
Status: DISCONTINUED | OUTPATIENT
Start: 2018-11-06 | End: 2018-11-09 | Stop reason: HOSPADM

## 2018-11-06 RX ORDER — SODIUM CHLORIDE, SODIUM LACTATE, POTASSIUM CHLORIDE, CALCIUM CHLORIDE 600; 310; 30; 20 MG/100ML; MG/100ML; MG/100ML; MG/100ML
INJECTION, SOLUTION INTRAVENOUS CONTINUOUS
Status: DISCONTINUED | OUTPATIENT
Start: 2018-11-06 | End: 2018-11-07

## 2018-11-06 RX ORDER — NAPROXEN 500 MG/1
500 TABLET ORAL 2 TIMES DAILY PRN
Status: DISCONTINUED | OUTPATIENT
Start: 2018-11-06 | End: 2018-11-06

## 2018-11-06 RX ORDER — POTASSIUM CHLORIDE 7.45 MG/ML
10 INJECTION INTRAVENOUS
Status: DISCONTINUED | OUTPATIENT
Start: 2018-11-06 | End: 2018-11-09 | Stop reason: HOSPADM

## 2018-11-06 RX ORDER — ONDANSETRON 2 MG/ML
4 INJECTION INTRAMUSCULAR; INTRAVENOUS EVERY 6 HOURS PRN
Status: DISCONTINUED | OUTPATIENT
Start: 2018-11-06 | End: 2018-11-09 | Stop reason: HOSPADM

## 2018-11-06 RX ORDER — GLYCOPYRROLATE 0.2 MG/ML
INJECTION, SOLUTION INTRAMUSCULAR; INTRAVENOUS PRN
Status: DISCONTINUED | OUTPATIENT
Start: 2018-11-06 | End: 2018-11-06

## 2018-11-06 RX ORDER — METOCLOPRAMIDE 10 MG/1
10 TABLET ORAL EVERY 6 HOURS PRN
Status: DISCONTINUED | OUTPATIENT
Start: 2018-11-06 | End: 2018-11-09 | Stop reason: HOSPADM

## 2018-11-06 RX ORDER — METOCLOPRAMIDE HYDROCHLORIDE 5 MG/ML
10 INJECTION INTRAMUSCULAR; INTRAVENOUS EVERY 6 HOURS PRN
Status: DISCONTINUED | OUTPATIENT
Start: 2018-11-06 | End: 2018-11-09 | Stop reason: HOSPADM

## 2018-11-06 RX ADMIN — MIDAZOLAM 0.5 MG: 1 INJECTION INTRAMUSCULAR; INTRAVENOUS at 11:48

## 2018-11-06 RX ADMIN — ONDANSETRON 4 MG: 2 INJECTION INTRAMUSCULAR; INTRAVENOUS at 13:25

## 2018-11-06 RX ADMIN — MIDAZOLAM 1 MG: 1 INJECTION INTRAMUSCULAR; INTRAVENOUS at 11:35

## 2018-11-06 RX ADMIN — FENTANYL CITRATE 50 MCG: 50 INJECTION, SOLUTION INTRAMUSCULAR; INTRAVENOUS at 12:30

## 2018-11-06 RX ADMIN — LIDOCAINE HYDROCHLORIDE 3 ML: 40 INJECTION, SOLUTION RETROBULBAR; TOPICAL at 10:50

## 2018-11-06 RX ADMIN — SODIUM CHLORIDE, POTASSIUM CHLORIDE, SODIUM LACTATE AND CALCIUM CHLORIDE: 600; 310; 30; 20 INJECTION, SOLUTION INTRAVENOUS at 15:20

## 2018-11-06 RX ADMIN — ROCURONIUM BROMIDE 30 MG: 10 INJECTION INTRAVENOUS at 12:13

## 2018-11-06 RX ADMIN — DEXAMETHASONE SODIUM PHOSPHATE 10 MG: 10 INJECTION, SOLUTION INTRAMUSCULAR; INTRAVENOUS at 13:19

## 2018-11-06 RX ADMIN — Medication 0.5 MG: at 14:11

## 2018-11-06 RX ADMIN — Medication 0.5 MG: at 14:38

## 2018-11-06 RX ADMIN — ALLOPURINOL 500 MG: 100 TABLET ORAL at 08:27

## 2018-11-06 RX ADMIN — SUGAMMADEX 400 MG: 100 INJECTION, SOLUTION INTRAVENOUS at 13:36

## 2018-11-06 RX ADMIN — SODIUM CHLORIDE, POTASSIUM CHLORIDE, SODIUM LACTATE AND CALCIUM CHLORIDE: 600; 310; 30; 20 INJECTION, SOLUTION INTRAVENOUS at 11:35

## 2018-11-06 RX ADMIN — SODIUM CHLORIDE: 9 INJECTION, SOLUTION INTRAVENOUS at 11:40

## 2018-11-06 RX ADMIN — PROPOFOL 80 MG: 10 INJECTION, EMULSION INTRAVENOUS at 11:56

## 2018-11-06 RX ADMIN — GLYCOPYRROLATE 0.4 MG: 0.2 INJECTION, SOLUTION INTRAMUSCULAR; INTRAVENOUS at 11:18

## 2018-11-06 RX ADMIN — SODIUM CHLORIDE, POTASSIUM CHLORIDE, SODIUM LACTATE AND CALCIUM CHLORIDE: 600; 310; 30; 20 INJECTION, SOLUTION INTRAVENOUS at 21:30

## 2018-11-06 RX ADMIN — POTASSIUM CHLORIDE AND SODIUM CHLORIDE: 900; 150 INJECTION, SOLUTION INTRAVENOUS at 03:35

## 2018-11-06 RX ADMIN — DEXMEDETOMIDINE HYDROCHLORIDE 0.6 MCG/KG/HR: 4 INJECTION, SOLUTION INTRAVENOUS at 11:36

## 2018-11-06 RX ADMIN — FENTANYL CITRATE 50 MCG: 50 INJECTION, SOLUTION INTRAMUSCULAR; INTRAVENOUS at 11:38

## 2018-11-06 RX ADMIN — ROCURONIUM BROMIDE 40 MG: 10 INJECTION INTRAVENOUS at 11:59

## 2018-11-06 RX ADMIN — ROCURONIUM BROMIDE 30 MG: 10 INJECTION INTRAVENOUS at 12:33

## 2018-11-06 RX ADMIN — VANCOMYCIN HYDROCHLORIDE 2000 MG: 1 INJECTION, POWDER, LYOPHILIZED, FOR SOLUTION INTRAVENOUS at 12:15

## 2018-11-06 RX ADMIN — PHENYLEPHRINE HYDROCHLORIDE 100 MCG: 10 INJECTION, SOLUTION INTRAMUSCULAR; INTRAVENOUS; SUBCUTANEOUS at 13:03

## 2018-11-06 RX ADMIN — FENTANYL CITRATE 25 MCG: 50 INJECTION INTRAMUSCULAR; INTRAVENOUS at 14:16

## 2018-11-06 RX ADMIN — SODIUM CHLORIDE, POTASSIUM CHLORIDE, SODIUM LACTATE AND CALCIUM CHLORIDE: 600; 310; 30; 20 INJECTION, SOLUTION INTRAVENOUS at 11:10

## 2018-11-06 RX ADMIN — FENTANYL CITRATE 50 MCG: 50 INJECTION, SOLUTION INTRAMUSCULAR; INTRAVENOUS at 11:42

## 2018-11-06 RX ADMIN — PIPERACILLIN SODIUM,TAZOBACTAM SODIUM 4.5 G: 4; .5 INJECTION, POWDER, FOR SOLUTION INTRAVENOUS at 08:29

## 2018-11-06 RX ADMIN — PIPERACILLIN SODIUM,TAZOBACTAM SODIUM 4.5 G: 4; .5 INJECTION, POWDER, FOR SOLUTION INTRAVENOUS at 14:49

## 2018-11-06 RX ADMIN — AMLODIPINE BESYLATE 5 MG: 5 TABLET ORAL at 08:26

## 2018-11-06 RX ADMIN — POTASSIUM CITRATE AND CITRIC ACID MONOHYDRATE 15 MEQ: 1100; 334 SOLUTION ORAL at 08:26

## 2018-11-06 RX ADMIN — PIPERACILLIN SODIUM,TAZOBACTAM SODIUM 4.5 G: 4; .5 INJECTION, POWDER, FOR SOLUTION INTRAVENOUS at 01:51

## 2018-11-06 RX ADMIN — CEFTRIAXONE 1 G: 1 INJECTION, POWDER, FOR SOLUTION INTRAMUSCULAR; INTRAVENOUS at 19:45

## 2018-11-06 RX ADMIN — ASPIRIN 325 MG ORAL TABLET 325 MG: 325 PILL ORAL at 08:25

## 2018-11-06 RX ADMIN — MIDAZOLAM 1 MG: 1 INJECTION INTRAMUSCULAR; INTRAVENOUS at 11:34

## 2018-11-06 RX ADMIN — VITAMIN D, TAB 1000IU (100/BT) 2000 UNITS: 25 TAB at 08:25

## 2018-11-06 RX ADMIN — MIDAZOLAM 1 MG: 1 INJECTION INTRAMUSCULAR; INTRAVENOUS at 11:51

## 2018-11-06 RX ADMIN — FENTANYL CITRATE 25 MCG: 50 INJECTION INTRAMUSCULAR; INTRAVENOUS at 14:11

## 2018-11-06 RX ADMIN — Medication 0.5 MG: at 15:14

## 2018-11-06 ASSESSMENT — ACTIVITIES OF DAILY LIVING (ADL)
COGNITION: 0 - NO COGNITION ISSUES REPORTED
RETIRED_COMMUNICATION: 0-->UNDERSTANDS/COMMUNICATES WITHOUT DIFFICULTY
ADLS_ACUITY_SCORE: 9
RETIRED_EATING: 0-->INDEPENDENT
ADLS_ACUITY_SCORE: 9
SWALLOWING: 2-->DIFFICULTY SWALLOWING LIQUIDS
DRESS: 0-->INDEPENDENT
TRANSFERRING: 0-->INDEPENDENT
WHICH_OF_THE_ABOVE_FUNCTIONAL_RISKS_HAD_A_RECENT_ONSET_OR_CHANGE?: SWALLOWING
TOILETING: 0-->INDEPENDENT
ADLS_ACUITY_SCORE: 11
FALL_HISTORY_WITHIN_LAST_SIX_MONTHS: NO
BATHING: 0-->INDEPENDENT
AMBULATION: 0-->INDEPENDENT

## 2018-11-06 ASSESSMENT — PAIN DESCRIPTION - DESCRIPTORS: DESCRIPTORS: ACHING;SORE

## 2018-11-06 NOTE — PHARMACY-VANCOMYCIN DOSING SERVICE
Pharmacy Vancomycin Initial Note  Date of Service 2018  Patient's  1970  47 year old, female    Indication: Abscess    Current estimated CrCl = Estimated Creatinine Clearance: 156.1 mL/min (based on Cr of 0.86).    Creatinine for last 3 days  2018:  1:51 PM Creatinine 0.85 mg/dL  2018: 12:00 AM Creatinine 0.86 mg/dL    Recent Vancomycin Level(s) for last 3 days  No results found for requested labs within last 72 hours.      Vancomycin IV Administrations (past 72 hours)                   vancomycin (VANCOCIN) 3,000 mg in sodium chloride 0.9 % 500 mL intermittent infusion (mg) 3,000 mg New Bag 18 194                Nephrotoxins and other renal medications (Future)    Start     Dose/Rate Route Frequency Ordered Stop    18 0130  piperacillin-tazobactam (ZOSYN) 4.5 g vial to attach to  mL bag      4.5 g  over 30 Minutes Intravenous EVERY 6 HOURS 18 0033      18 0033  naproxen (NAPROSYN) tablet 500 mg      500 mg Oral 2 TIMES DAILY PRN 18 0033            Contrast Orders - past 72 hours     None                Plan:  1.  Start vancomycin 3000 mg IV once (given at [place]), then 2000 mg IV q12h.   2.  Goal Trough Level: 15-20 mg/L   3.  Pharmacy will check trough levels as appropriate in 1-3 Days.    4. Serum creatinine levels will be ordered daily for the first week of therapy and at least twice weekly for subsequent weeks.    5. [place] method utilized to dose vancomycin therapy: Method 2    Carlton Sloan

## 2018-11-06 NOTE — BRIEF OP NOTE
[facility], [place]    Brief Operative Note    Pre-operative diagnosis: Esophageal Perforation   Post-operative diagnosis Anterior mediastinal mass    Procedure: Procedure(s):  Transcervical Mediastinal Abscess Drainage  Surgeon: Surgeon(s) and Role:     * Bismark Sewell MD - Primary     * Diamante Ivy MD - Assisting    Anesthesia: General     Estimated blood loss: Less than 10 ml    Drains: None  Specimens:   ID Type Source Tests Collected by Time Destination   1 : Mediastinal Mass Fluid Mediastinum ANAEROBIC BACTERIAL CULTURE, FLUID CULTURE AEROBIC BACTERIAL, FUNGUS CULTURE Bismark Sewell MD 11/6/2018 12:36 PM    A : Upper Mediastinal Mass Tissue Mediastinum SURGICAL PATHOLOGY EXAM Bismark Sewell MD 11/6/2018 12:59 PM    B : Left Thyroid Biopsy Tissue Thyroid, left SURGICAL PATHOLOGY EXAM Bismark Sewell MD 11/6/2018  1:20 PM      Findings:   No fluid collection noted in anterior mediastinum.  Noted mass at octaviano medial to trachea with dense adhesions.  Tissue biopsy sent.  High suspicion of thyroid malignancy: follicular vs medullary.  Will await final path    Complications: None.  Implants: None    Admit to 6B for airway monitoring overnight due to morbid obesity.  Intraop consult to Dr. Peter, biopsy of mass. Sent to path, prelim dx: ?medullary vs follicular ca   Consult to ENT for follow up   CEA, calcitonin   Ultrasound may show artifacts from air in tissues, may need to wait a few days for formal ultrasound.  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  Consider swallow eval before PO intake.    Diamante Ivy  Thoracic Surgery Fellow  [phone]

## 2018-11-06 NOTE — ANESTHESIA PREPROCEDURE EVALUATION
Anesthesia Pre-Procedure Evaluation    Patient: Carol Hurst   MRN:     [id number] Gender:   female   Age:    47 year old :      1970        Preoperative Diagnosis: Esophageal Perforation    Procedure(s):  Transcervical Mediastinal Abscess Drainage     Past Medical History:   Diagnosis Date     Cerebral infarction (H)     Mild right hand deficits, coordination      Fatty liver      Gout      Herniated cervical disc      Hypertension      Leukocytosis, unspecified type     Patient states ongoing     Nocturia      Obesity      FRANCESCA (obstructive sleep apnea)      Prediabetes       History reviewed. No pertinent surgical history.       Anesthesia Evaluation     . Pt has had prior anesthetic.            ROS/MED HX    ENT/Pulmonary:     (+)sleep apnea, uses CPAP , . .    Neurologic:     (+)CVA with deficits- right hand,     Cardiovascular:     (+) hypertension----. : . . . :. .       METS/Exercise Tolerance:     Hematologic:         Musculoskeletal:         GI/Hepatic:     (+) liver disease,       Renal/Genitourinary:         Endo:     (+) Obesity, .      Psychiatric:         Infectious Disease:         Malignancy:         Other:                         PHYSICAL EXAM:   Mental Status/Neuro: A/A/O   Airway: Facies: Feasible  Mallampati: III  Mouth/Opening: Full  TM distance: > 6 cm  Neck ROM: Full  Device in place: ETT   Respiratory: Auscultation: CTAB     Resp. Rate: Normal     Resp. Effort: Normal      CV: Rhythm: Regular  Rate: Age appropriate  Heart: Normal Sounds   Comments:                    Lab Results   Component Value Date    WBC 16.0 (H) 2018    HGB 13.1 2018    HCT 42.5 2018     2018     2018    POTASSIUM 4.5 2018    CHLORIDE 104 2018    CO2 23 2018    BUN 21 2018    CR 0.86 2018     (H) 2018    DIOMEDES 8.7 2018       Preop Vitals  BP Readings from Last 3 Encounters:   18 118/66   18 (!) 145/92  "   Pulse Readings from Last 3 Encounters:   11/06/18 94   11/05/18 109      Resp Readings from Last 3 Encounters:   11/06/18 20   11/05/18 24    SpO2 Readings from Last 3 Encounters:   11/06/18 96%   11/05/18 94%      Temp Readings from Last 1 Encounters:   11/06/18 35.9  C (96.7  F) (Oral)    Ht Readings from Last 1 Encounters:   11/06/18 1.816 m (5' 11.5\")      Wt Readings from Last 1 Encounters:   11/05/18 (!) 200.9 kg (443 lb)    Estimated body mass index is 60.92 kg/(m^2) as calculated from the following:    Height as of this encounter: 1.816 m (5' 11.5\").    Weight as of an earlier encounter on 11/5/18: 200.9 kg (443 lb).     LDA:  Peripheral IV 11/06/18 Right Lower forearm (Active)   Site Assessment WDL 11/6/2018  3:00 AM   Line Status Saline locked 11/6/2018  3:00 AM   Phlebitis Scale 0-->no symptoms 11/6/2018  3:00 AM   Infiltration Scale 0 11/6/2018  3:00 AM   Infiltration Site Treatment Method  None 11/6/2018  3:00 AM   Extravasation? No 11/6/2018  3:00 AM   Dressing Intervention New dressing  11/6/2018  3:00 AM   Number of days:0            Assessment:   ASA SCORE: 3 emergent     NPO Status: > 2 hours since completed Clear Liquids; > 6 hours since completed Solid Foods   Documentation: H&P complete; Consents complete   Proceeding: Proceed without further delay     Plan:   Anes. Type:  General   Pre-Induction: Midazolam IV   Induction:  IV (Standard) (Awake intubation)   Airway: Oral ETT   Access/Monitoring: PIV   Maintenance: Balanced   Emergence: Procedure Site   Logistics: Same Day Surgery     Postop Pain/Sedation Strategy:  Standard-Options: Opioids PRN     PONV Management:  Adult Risk Factors: Female, Non-Smoker, Postop Opioids  Prevention: Ondansetron     CONSENT: Direct conversation   Plan and risks discussed with: Patient   Blood Products: Consented (ALL Blood Products)                         Lázaro Agustin MD  "

## 2018-11-06 NOTE — CONSULTS
ENT Consult     Patient seen inta-op by Dr. Peter for suspected mediastinal mass. Tissue biopsies sent. ENT will follow with further management pending biopsy results.      -Agree with Thoracic recommendations  -Additional cares per primary team

## 2018-11-06 NOTE — H&P
General acute hospital Medicine History and Physical        Date of Admission:  11/5/2018  Date of Service: 11/6/2018         HPI      Chief Complaint   Sore throat and laryngitis    History is obtained from the patient    History of Present Illness   Carol Hurst is a 47 year old female with a PMHx of a stroke (2011), HTN, prediabetes, obesity, FRANCESCA, fatty liver, gout, nocturia, hematuria, kidney stones, and a herniated cervical disc, who is admitted for further evaluation of a large mediastinal mass noted on CT.    Patient presented to [facility] ED with a 1 day history of sore throat, difficulty swallowing, and was losing her voice. 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 Her last meal was Saturday around 1pm which was 2 large hamburgers and fries. The sore throat and difficulty swallowing started about 3-4 hours later. She also complained of subj fever, chills, headache, cough with any liquid ingestion, neck pain, mild SOB, and epigastric chest pain. Denied nausea, vomiting, abdominal pain, musculoskeletal pain or swelling, or dysuria. In [facility] ED, her temp was 100.1, she was satting well on RA, tachycardic to 109, with /94. Labs were notable for WBC 16.0, hgb 13.3, platelets 330, and rapid strep test positive. Was started on amoxicillin for positive strep test. Chest xray was performed as well. Chest xray showed a left superior mediastinal mass displacing and narrowing the trachea. Folliw up CT showed large left mediastinal mass 5.0x3.0x2.7 cm. Differential lymph node mass, esophageal mass, or complex fluid collection/abscess. Given patient's history of competitive eating, they favored esophageal rupture with resultant abscess formation. Switched to vanc/zosyn and thoracic surgery recommended transfer to [facility] for definitive care. Patient almost left AMA, but after several long discussions, she agreed to stay and transfer to  here.         History:   Review of Systems   The 10 point Review of Systems is negative other than noted in the HPI or here.   Review of Systems    Past Medical History    I have reviewed this patient's medical history and updated it with pertinent information if needed.   Past Medical History:   Diagnosis Date     Cerebral infarction (H) 2011    Mild right hand deficits, coordination      Fatty liver      Gout      Herniated cervical disc      Hypertension      Leukocytosis, unspecified type     Patient states ongoing     Nocturia      Obesity      FRANCESCA (obstructive sleep apnea)      Prediabetes         Past Surgical History   I have reviewed this patient's surgical history and updated it with pertinent information if needed.  No past surgical history on file. Never had surgery.    Social History   Social History   Substance Use Topics     Smoking status: Never Smoker     Smokeless tobacco: Never Used     Alcohol use No       Family History   I have reviewed this patient's family history and updated it with pertinent information if needed.   Family History   Problem Relation Age of Onset     Colon Cancer Mother        Prior to Admission Medications   Prior to Admission Medications   Prescriptions Last Dose Informant Patient Reported? Taking?   ALLOPURINOL PO   Yes No   Sig: Take 300 mg by mouth daily Patient takes 500mg total daily, 1 300mg tablet and 2 100mg tabled once daily   AMLODIPINE BESYLATE PO   Yes No   Sig: Take 5 mg by mouth daily   ASPIRIN PO   Yes No   Sig: Take 325 mg by mouth   NAPROXEN PO   Yes No   Sig: Take 500 mg by mouth 2 times daily as needed for moderate pain   POTASSIUM CITRATE PO   Yes No   Sig: Take 15 mEq by mouth 2 times daily   VITAMIN D, CHOLECALCIFEROL, PO   Yes No   Sig: Take 2,000 Units by mouth daily      Facility-Administered Medications: None     Allergies   Allergies   Allergen Reactions     Ace Inhibitors      Haldol [Haloperidol]      Serotonin Reuptake Inhibitors       Thorazine [Chlorpromazine]            Physical Exam      Vital Signs: Temp: 98.6  F (37  C) Temp src: Oral BP: 144/81 Pulse: 87   Resp: 16 SpO2: 92 % O2 Device: None (Room air)    Weight: 0 lbs 0 oz    Physical Exam   Constitutional: She is oriented to person, place, and time.   Morbidly obese female, not in any distress   HENT:   Head: Normocephalic and atraumatic.   Mouth/Throat: Oropharynx is clear and moist. No oropharyngeal exudate.   Eyes: Conjunctivae and EOM are normal. Pupils are equal, round, and reactive to light.   Neck: Normal range of motion. Tracheal tenderness (on palpation of anterior neck when swallowing) present. No tracheal deviation present.   Cardiovascular: Normal rate, regular rhythm and normal heart sounds.  Exam reveals no gallop and no friction rub.    No murmur heard.  Pulmonary/Chest: No stridor. She has no decreased breath sounds. She has no wheezes. She has no rhonchi. She has no rales. She exhibits no tenderness (epigastrum).   Patient could not speak in full sentences. Voice hoarse, almost whispering. Satting > 92% on RA.   Abdominal: Soft. Bowel sounds are normal. There is no tenderness. There is no rebound and no guarding.   Neurological: She is alert and oriented to person, place, and time.   Skin: Skin is warm and dry.   Psychiatric: She has a normal mood and affect. Her behavior is normal. Judgment and thought content normal.       Assessment & Plan      Carol Hurst is a 47 year old female admitted on 11/5/2018. She has a PMHx of a stroke (2011), HTN, prediabetes, obesity, FRANCESCA, fatty liver, gout, nocturia, hematuria, kidney stones, and a herniated cervical disc, who is admitted for further evaluation of a large mediastinal mass noted on CT.    # Large left mediastinal mass  # Strep pharyngitis  # SOB  # Hoarseness  Chest xray showed a left superior mediastinal mass displacing and narrowing the trachea. Chest CT showed large left medistinal mass 5.0x3.0x2.7 cm.  Differentials include lymph node mass, esophageal mass, or complex fluid collection/abscess. Given patient's history of competitive eating, [facility] favored esophageal rupture with resultant abscess formation. However, I would expect patient to be septic and more ill appearing. Given leukocytosis, subjective fever, chills, rapid strep positive, and mediastinal mass, feel antibiotics are warranted at this time. Malignancy is low on differential due to rapid onset.  - Consult thoracic surgery, appreciate recs  - Repeated CBC and BMP once she arrived to continue to monitor for sepsis as well as in the AM, lactate pending from [facility]  - Vitals Q4H  - NPO in case of surgery/procedure in AM  - NS + 20 KCl at 125 ml/hr  - Started vanc/zosyn (does not look as if she actually got starting doses at [facility])    Chronic medical problems:  # Hx of stroke  - Continue PTA aspirin   # FRANCESCA  - Hold CPAP for now due to possible esophageal rupture/abscess  # HTN  - Continue PTA amlodipine  # Gout  - Continue PTA allopurinol  # Kidney stones  - Continue PTA KCL    # Pain Assessment:  Current Pain Score 11/5/2018   Pain score (0-10) 7   - Carol is experiencing pain due to strep pharyngitis and large mediastinal mass. Pain management was discussed and the plan was created in a collaborative fashion.  Carol's response to the current recommendations: engaged  - Please see the plan for pain management as documented above        Diet: NPO per Anesthesia Guidelines for Procedure/Surgery Except for: Meds  Fluids: NS + 20 KCl at 125 ml/hr  DVT Prophylaxis: Ambulation for now in case of surgery/procedure  Code Status: Full Code    Disposition Plan   Expected discharge: 4 - 7 days; recommended to depends on management once adequate pain management/ tolerating PO medications and consultants seen and management plan established. Dispo: Expected Discharge Date: 11/09/18        Entered: Sana Bee 11/06/2018, 1:25 AM   Information in the  above section will display in the discharge planner report.    The patient was discussed with Dr. Pollard.    Sana Clements MD  PGY-2    [facility] Inpatient Service  [facility]   Pager: [phone]  Please see sticky note for cross cover information      Results:     Data     Recent Labs  Lab 11/06/18  0000 11/05/18  1351   WBC 16.0* 16.0*   HGB 13.1 13.3   MCV 92 90    330    137   POTASSIUM 4.5 3.9   CHLORIDE 104 102   CO2 23 29   BUN 21 19   CR 0.86 0.85   ANIONGAP 11 6   DIOMEDES 8.7 8.7   * 84       Recent Results (from the past 24 hour(s))   Chest XR,  PA & LAT    Narrative    XR CHEST TWO VIEWS   11/5/2018 3:48 PM     HISTORY: Cough, dyspnea.     COMPARISON: None.    FINDINGS: The heart is enlarged. There is no pulmonary edema. There is  a left superior mediastinal mass causing displacement and narrowing of  the trachea. The lungs are clear. No pneumothorax or pleural effusion.  Prominent right cardiophrenic angle fat pad.      Impression    IMPRESSION: Left superior mediastinal mass displacing and narrowing  the trachea.   CT Chest w Contrast    Narrative    CT CHEST WITH CONTRAST   11/5/2018 5:30 PM     HISTORY: Better characterization of mediastinal mass.    TECHNIQUE: 80mL Isovue-370. Radiation dose for this scan was reduced  using automated exposure control, adjustment of the mA and/or kV  according to patient size, or iterative reconstruction technique.    COMPARISON: Chest x-ray from today.    FINDINGS:  There is a large left mediastinal mass that is ill-defined  and immediately adjacent to the esophagus measuring approximately 5.0  x 3.0 x 2.7 cm in size. This could be a lymph node mass. An esophageal  mass would be difficult to exclude. No other areas of adenopathy  identified. A complex fluid collection or even abscess would be  difficult to exclude. Lungs are clear. No pleural fluid. No evidence  of aortic aneurysm or dissection.    Upper abdominal  included images demonstrate fatty infiltration of the  liver.      Impression    IMPRESSION:  1. Large left mediastinal mass that is ill-defined and immediately  adjacent to the esophagus measuring approximate 5.0 x 3.0 x 2.7 cm in  size. Differential diagnosis includes lymph node mass, esophageal  mass, or complex fluid collection/abscess.  2. Lungs are clear. No pleural or pericardial fluid.    HUNTER MONTGOMERY MD           Attestation:    I  discussed the case with the primary resident  the care team. I agree with the findings and plan in this note. I have reviewed today's vital signs, medications, laboratory results.     Liz Pollard MD  [facility] Medicine

## 2018-11-06 NOTE — PROGRESS NOTES
[facility], [facility] Progress Note - Page's Service       Main Plans for Today   - Consulting ENT  - Had exploratory procedure with lymph node biopsy per thoracic surgery  - Discontinue vancomycin and zosyn  - Start ceftriaxone    Assessment & Plan   Carol Hurst is a 47 year old female admitted on 11/5/2018. She has a PMHx of a stroke (2011), HTN, prediabetes, obesity, FRANCESCA, fatty liver, gout, nocturia, hematuria, kidney stones, and a herniated cervical disc, who is admitted for further evaluation of a large mediastinal mass noted on CT.     # Large left mediastinal mass  # Shortness of breath   Chest xray showed a left superior mediastinal mass displacing and narrowing the trachea. Chest CT showed large left medistinal mass 5.0x3.0x2.7 cm, located largely in superior aspect of mediastinum. Thoracic surgery performed EGD, bronchoscopy, mediastinoscopy, and cervical lymph node biopsy, found no abscess or fluid collection during exploration. ENT also consulted in the OR and performed laryngoscopy, which showed no abnormalities of vocal folds or larynx. Differential includes thyroid malignancy vs thymoma vs lymphoma vs germ cell tumor. Per thoracic surgery, most concerning for thyroid cancer, follicular vs medullary. Lymph node biopsy pathology pending. Mass may be contributing to hoarse voice, due to compression of larynx and recurrent laryngeal nerve but will have ENT evaluate her for other possible causes.  - Consulted thoracic surgery, appreciate recs  - ENT consulted by thoracic surgery, appreciate recs  - Follow up mediastinal mass and lymph node biopsies  - Will need thyroid and neck US but will wait until she is out of acute post-op period  - 1 L LR bolus given for planned neck CT with contrast (to avoid kidney injury given she will receive contrast 2 days in a row) though cancelled CT due to procedure findings in mediastinum and recommendation for neck  US  - Repeat CBC in am to follow elevated BP and Hgb following procedure  - Repeat BMP in am to follow kidney function following 2 instances of contrast for CTs  - Check calcitonin, CEA, CRP  - Vitals Q4H  - NPO for now to avoid aspiration following procedure  - IVF  ml/hr, no potassium as K is 4.5 today  - Discontinue vancomycin and zosyn since she does not have an abscess, so broad coverage is not likely indicated  - Will remain in progressive care unit for tonight to closely monitor airway    # Hoarseness  No vocal cord edema or other laryngeal process seen on laryngoscopy. May be due to compression of recurrent laryngeal nerve by mass vs Strep pharyngitis. Sudden onset of hoarseness argues more towards Strep as a malignancy usually does not cause acute changes such as the patient describes. However, could be that the mass increased enough to compress the recurrent laryngeal nerve or shifted something in the neck to do the same. Will continue to follow while awaiting pathology and see if voice improves with treatment of Strep. If hoarseness is due to mass, likely will not improve until mass is treated.  - Consulted ENT for further assessment/workup of voice change    # Strep pharyngitis  May be contributing to hoarse voice and some of throat pain, though also is likely she has pain due to mediastinal mass and compression of trachea, esophagus, and surrounding structures. Will treat for acute Strep infection.  - Start ceftriaxone IV, want to avoid swallowing due to risk for aspiration and current compression of esophagus by mediastinal mass  - ENT consulted as above     Chronic medical problems:  # Hx of stroke  - Continue PTA aspirin     # FRANCESCA  - Resume CPAP, esphageal rupture is unlikely based on findings from thoracic surgery procedure    # HTN  - Continue PTA amlodipine    # Gout  - Hold PTA allopurinol because giving contrast for CTs to avoid kidney injury    # Kidney stones  - Hold PTA KCL due to K  4.5    # Pain Assessment:  Current Pain Score 11/5/2018   Pain score (0-10) 7   - Carol is experiencing pain due to strep pharyngitis and large mediastinal mass. Pain management was discussed and the plan was created in a collaborative fashion.  Carol's response to the current recommendations: engaged  - Please see the plan for pain management as documented above    Diet: NPO per Anesthesia Guidelines for Procedure/Surgery Except for: Meds  Fluids: IVF LR @ 125 ml/hr (no K as K is 4.5 11/6)  DVT Prophylaxis: Pneumatic Compression Devices for now in case of procedure, then will switch to Lovenox  Code Status: Full Code    Disposition Plan   Expected discharge: 4 - 7 days, recommended to depends on management once adequate pain management/ tolerating PO medications and management plan established. Dispo: Expected Discharge Date: 11/09/18        Entered: Maryana Henry 11/06/2018, 8:35 AM   Information in the above section will display in the discharge planner report.      The patient's care was discussed with the Attending Physician, Dr. Fuller.    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 She was originally seen at [facility] ED on 11/5/18 with a 1 day history of sore throat, difficulty swallowing, and hoarse voice, which began about 3-4 hours after a meal, as well as subj fever, chills, headache, cough with any liquid ingestion, neck pain, mild SOB, and epigastric chest pain. In [facility] ED, vitals notable for tachycardia and labs significant for WBC 16.0 and rapid strep test  positive. She was started on amoxicillin for strep pharyngitis. Chest XR showed a left superior mediastinal mass displacing and narrowing the trachea. Follow up chest CT showed large left mediastinal mass 5.0x3.0x2.7 cm. Antibiotics switched to vanc/zosyn with concern for esophageal rupture and abscess formation, and thoracic surgery recommended transfer to [facility] for definitive care. Patient almost left AMA, but after several long discussions, she agreed to stay and transfer here.    On admission, thoracic surgery decided to perform transcervical mediastinal exploratory procedure, including EGD, bronchoscopy, and mediastinoscopy. Found the mass was not an abscess, rather is concerning for malignancy. Obtained cervical lymph node biopsy and tissue from the mass for pathology and cultures. ENT consulted, as well, and performed laryngoscopy, which showed no acute changes to larynx or vocal folds to explain hoarse voice.    Interval History   Patient feels okay this morning. Still has mild throat pain but it is improved from last night. She has not had anything to eat or drink but she has some pain and difficulty with swallowing saliva. She has some shortness of breath at baseline but not usually when lying down. She is feeling mildly short of breath while in bed but notes it may be more due to her having to work so hard to speak. She has significant difficulty speaking and has a hoarse, quiet voice. She has no headaches, cough, chest pain, abdominal pain, nausea, diarrhea, or constipation.    Physical Exam   Vital Signs: Temp: 96.7  F (35.9  C) Temp src: Oral BP: 118/66 Pulse: 94   Resp: 20 SpO2: 96 % O2 Device: None (Room air)    Weight: 0 lbs 0 oz  General: no acute distress, speaks with hoarse, quiet voice in short phrases not complete sentence  HEENT: normocephalic, atraumatic, oropharynx difficult to visualize but without obvious erythema or exudate  Neck: neck with significant amount of soft tissue, no cervical  lymphadenopathy palpated, thyroid non-palpable  Respiratory: exam difficult due to body habitus, lungs sounds distant but clear to auscultation bilaterally, no wheezes, rhonchi, or rales  Cardiac: exam difficult due to body habitus, regular rate and rhythm, S1/S2 heard  Abdominal: obese, non-distended, active bowel sounds, soft, non-tender to palpation in all quadrants  Musculoskeletal: no obvious deformities  Skin: several linear, lighter-than-skin-color scars on bilateral forearms extensor surfaces  Neurologic: cranial nerves grossly intact, moves all extremities equally, no obvious focal deficits  Psychiatric: appropriate mood and affect    Data     Recent Labs  Lab 11/06/18  0000 11/05/18  1351   WBC 16.0* 16.0*   HGB 13.1 13.3   MCV 92 90    330    137   POTASSIUM 4.5 3.9   CHLORIDE 104 102   CO2 23 29   BUN 21 19   CR 0.86 0.85   ANIONGAP 11 6   DIOMEDES 8.7 8.7   * 84       Recent Results (from the past 24 hour(s))   Chest XR,  PA & LAT    Narrative    XR CHEST TWO VIEWS   11/5/2018 3:48 PM     HISTORY: Cough, dyspnea.     COMPARISON: None.    FINDINGS: The heart is enlarged. There is no pulmonary edema. There is  a left superior mediastinal mass causing displacement and narrowing of  the trachea. The lungs are clear. No pneumothorax or pleural effusion.  Prominent right cardiophrenic angle fat pad.      Impression    IMPRESSION: Left superior mediastinal mass displacing and narrowing  the trachea.   CT Chest w Contrast    Narrative    CT CHEST WITH CONTRAST   11/5/2018 5:30 PM     HISTORY: Better characterization of mediastinal mass.    TECHNIQUE: 80mL Isovue-370. Radiation dose for this scan was reduced  using automated exposure control, adjustment of the mA and/or kV  according to patient size, or iterative reconstruction technique.    COMPARISON: Chest x-ray from today.    FINDINGS:  There is a large left mediastinal mass that is ill-defined  and immediately adjacent to the esophagus  measuring approximately 5.0  x 3.0 x 2.7 cm in size. This could be a lymph node mass. An esophageal  mass would be difficult to exclude. No other areas of adenopathy  identified. A complex fluid collection or even abscess would be  difficult to exclude. Lungs are clear. No pleural fluid. No evidence  of aortic aneurysm or dissection.    Upper abdominal included images demonstrate fatty infiltration of the  liver.      Impression    IMPRESSION:  1. Large left mediastinal mass that is ill-defined and immediately  adjacent to the esophagus measuring approximate 5.0 x 3.0 x 2.7 cm in  size. Differential diagnosis includes lymph node mass, esophageal  mass, or complex fluid collection/abscess.  2. Lungs are clear. No pleural or pericardial fluid.    HUNTER MONTGOMERY MD     Medications     0.9% sodium chloride + KCl 20 mEq/L Stopped (11/06/18 0751)       zz extemporaneous template  500 mg Oral Daily     amLODIPine (NORVASC) tablet 5 mg  5 mg Oral Daily     aspirin tablet 325 mg  325 mg Oral Daily     cholecalciferol  2,000 Units Oral Daily     piperacillin-tazobactam  4.5 g Intravenous Q6H     potassium citrate-citric acid  15 mEq Oral BID     sodium chloride (PF)  3 mL Intracatheter Q8H     vancomycin (VANCOCIN) IV  2,000 mg Intravenous Q12H     Resident/Fellow Attestation   I, Dileep Pineda, was present with the medical student who participated in the service and in the documentation of the note.  I have verified the history and personally performed the physical exam and medical decision making.  I agree with the assessment and plan of care as documented in the note.      Key findings; Patient with very hoarse voice on exam, comfortable, resting in bed. Thoracic surgery did biopsy today with the opinion that the mass is likely thyroid in origin and not infectious. Will discontinue Vanc/Zosyn and cover for strep pharyngitis with Ceftriaxone 1g Q12H. NPO tonight for aspiration risk.    Dileep Pineda,  MD  PGY1  Date of Service (when I saw the patient): 11/06/18

## 2018-11-06 NOTE — PLAN OF CARE
Problem: Patient Care Overview  Goal: Plan of Care/Patient Progress Review  Pt admitted from [facility] at 2230 for L mediastinal mass.A&Ox4. VSS on RA. No Cpap overnight per MD. Up independently. Denies pain. Triggered sepsis, lactic 1.3. NS+[id number] infusing at 125 ml/hr through R PIV. Reported difficulty swallowing liquids but able to swallow solids fine. NPO. Thoracic consult today.

## 2018-11-06 NOTE — ANESTHESIA PROCEDURE NOTES
Arterial Line Procedure Note  Staff:     Anesthesiologist:  MONIQUE CERON  Location: In OR Before Induction  Procedure Start/Stop Times:     patient identified, IV checked, site marked, risks and benefits discussed, informed consent, monitors and equipment checked, pre-op evaluation and at physician/surgeon's request      Correct Patient: Yes      Correct Position: Yes      Correct Site: Yes      Correct Procedure: Yes      Correct Laterality:  Yes    Site Marked:  Yes  Line Placement:     Procedure:  Arterial Line    Insertion Site:  Radial    Insertion laterality:  Right    Skin Prep: Chloraprep      Patient Prep: patient draped, mask, sterile gloves, hat and hand hygiene      Local skin infiltration:  1% lidocaine    amount (mL):  1    Ultrasound Guided?: Yes      Artery evaluated via ultrasound confirming patency.   Using realtime imaging, the artery was punctured and the needle was observed entering the artery.      A permanent image is entered into patient's chart.      Catheter size:  20 gauge, 12 cm    Cath secured with: suture      Dressing:  Tegaderm    Complications:  None obvious    Arterial waveform: Yes      IBP within 10% of NIBP: Yes

## 2018-11-06 NOTE — ANESTHESIA POSTPROCEDURE EVALUATION
Anesthesia POST Procedure Evaluation    Patient: Carol Hurst   MRN:     [id number] Gender:   female   Age:    47 year old :      1970        Preoperative Diagnosis: Esophageal Perforation    Procedure(s):  Transcervical  Cutdown, Biopsy of Medialstinal Mass, Esophagogastroduodenoscopy, Bronchoscopy.    COMBINED ESOPHAGOSCOPY, GASTROSCOPY, DUODENOSCOPY (EGD)  BRONCHOSCOPY (RIGID OR FLEXIBLE), DIAGNOSTIC  BIOPSY LYMPH NODE CERVICAL   Postop Comments: No value filed.       Anesthesia Type:  General    Reportable Event: NO     PAIN: Uncomplicated   Sign Out status: Comfortable, Well controlled pain     PONV: No PONV   Sign Out status:  No Nausea or Vomiting     Neuro/Psych: Uneventful perioperative course   Sign Out Status: Preoperative baseline; Age appropriate mentation     Airway/Resp.: Uneventful perioperative course   Sign Out Status: Non labored breathing, age appropriate RR; Resp. Status within EXPECTED Parameters     CV: Uneventful perioperative course   Sign Out status: Appropriate BP and perfusion indices; Appropriate HR/Rhythm     Disposition:   Sign Out in:  PACU  Disposition:  Phase II; Home  Recovery Course: Uneventful  Follow-Up: Not required           Last Anesthesia Record Vitals:  CRNA VITALS  2018 1320 - 2018 1407      2018             Pulse: 96    ART BP: (!)  150/106    ART Mean: 118    SpO2: 94 %    Resp Rate (set): 10          Last PACU/Preop Vitals:  Vitals:    18 0202 18 0552 18 1400   BP: 131/73 118/66 120/90   Pulse: 92 94    Resp: 20 20 22   Temp: 36.6  C (97.8  F) 35.9  C (96.7  F) 36.9  C (98.4  F)   SpO2: 94% 96% 94%         Electronically Signed By: Lázaro Agustin MD, 2018, 2:07 PM

## 2018-11-06 NOTE — PLAN OF CARE
Problem: Patient Care Overview  Goal: Plan of Care/Patient Progress Review  SLP: Bedside swallow eval orders received. Pt currently NPO for OR this date. Will follow up as appropriate.

## 2018-11-06 NOTE — PROGRESS NOTES
Transferred from PACU s/p mediastinal mass biopsy.  AOx4, ambulated to the bathroom with SBA, VSS, on 3L NC, afebrile.  Mild pain to posterior neck, anterior neck incision C/D/I.  NPO except for ice chips, ENT consult pending.  All belongings at bedside. Patient refused 2 RN skin assessment.

## 2018-11-06 NOTE — PROGRESS NOTES
"BRIEF PROGRESS NOTE    11/6/2018  4:54 PM    SUBJECTIVE/OBJECTIVE:  Performed baseline assessment on patient. Pt is awake and alert. Has mild throat pain following procedure but is otherwise feeling fine. Mouth is very dry. Not bleeding post-operatively. Not short of breath.    /81 (BP Location: Left arm)  Pulse 94  Temp 98  F (36.7  C) (Oral)  Resp 21  Ht 1.803 m (5' 11\")  SpO2 93%  BMI 61.79 kg/m2    Exam:  General: lying in bed, no acute distress  HEENT: normocephalic, atraumatic  Skin: transverse anterior cervical incision about 8 cm in length, clean, dry, and intact, dermabond in place, no crepitus appreciated  Neurologic: voice hoarse, no obvious focal deficits  Psychiatric: appropriate mood and affect      ASSESSMENT/PLAN:  Please see daily rounding note for full A/P.    Updates: awaiting pathology results from lymph node biopsy obtained during procedure      Maryana Henry, MS3  [employer]    Resident/Fellow Attestation   I, Ravindra Browne, was present with the medical student who participated in the service and in the documentation of the note.  I have verified the history and personally performed the physical exam and medical decision making.  I agree with the assessment and plan of care as documented in the note.      Ravindra Browne DO, MA  Family Medicine PGY-2  [facility], [facility]       "

## 2018-11-06 NOTE — ANESTHESIA CARE TRANSFER NOTE
Patient: Carol Hurst    Procedure(s):  Transcervical  Cutdown, Biopsy of Medialstinal Mass, Esophagogastroduodenoscopy, Bronchoscopy.    COMBINED ESOPHAGOSCOPY, GASTROSCOPY, DUODENOSCOPY (EGD)  BRONCHOSCOPY (RIGID OR FLEXIBLE), DIAGNOSTIC  BIOPSY LYMPH NODE CERVICAL    Diagnosis: Esophageal Perforation   Diagnosis Additional Information: No value filed.    Anesthesia Type:   No value filed.     Note:  Airway :Nasal Cannula  Patient transferred to:PACU  Comments: Oropharynx suctioned.  spont resp.  Cook catheter placed down ETT prior to extubation.  Extubated. Exchanging well. Cook catheter removed. NC placed at 6L.  ( No Face Mask available.).  To PACU.  Report to RN at bedside. Pt awake.  Complaining of neck pain.  Dr Agustin and surgical fellow aware. Handoff Report: Identifed the Patient, Identified the Reponsible Provider, Reviewed the pertinent medical history, Discussed the surgical course, Reviewed Intra-OP anesthesia mangement and issues during anesthesia, Set expectations for post-procedure period and Allowed opportunity for questions and acknowledgement of understanding      Vitals: (Last set prior to Anesthesia Care Transfer)    CRNA VITALS  11/6/2018 1320 - 11/6/2018 1407      11/6/2018             Pulse: 96    ART BP: (!)  150/106    ART Mean: 118    SpO2: 94 %    Resp Rate (set): 10                Electronically Signed By: LEATHA Fontenot CRNA  November 6, 2018  2:07 PM

## 2018-11-06 NOTE — PHARMACY-ADMISSION MEDICATION HISTORY
Pharmacy Vancomycin Note  Date of Service 2018  Patient's  1970  47 year old, female    Indication: Abscess (pharyngeal)    Current estimated CrCl = Estimated Creatinine Clearance: 156.1 mL/min (based on Cr of 0.86).    Creatinine for last 3 days  2018:  1:51 PM Creatinine 0.85 mg/dL  2018: 12:00 AM Creatinine 0.86 mg/dL    Recent Vancomycin Level(s) for last 3 days  No results found for requested labs within last 72 hours.      Vancomycin IV Administrations (past 72 hours)                   vancomycin (VANCOCIN) 2,000 mg in sodium chloride 0.9 % 500 mL intermittent infusion (mg) 2,000 mg New Bag 18 121    vancomycin (VANCOCIN) 3,000 mg in sodium chloride 0.9 % 500 mL intermittent infusion (mg) 3,000 mg New Bag 18 194                Nephrotoxins and other renal medications (Future)    Start     Dose/Rate Route Frequency Ordered Stop    18 1000  [Auto Hold]  lidocaine 3%, phenylephrine 0.25% solution for irrigation     (Auto Hold since 18 0950)   Comments:  Ok for non-sterile lidocaine    5 mL Irrigation ONCE 18 0950      18 1000  phenylephrine (JAKE-SYNEPHRINE) 50 mg in sodium chloride 0.9 % 250 mL infusion      0.5-6 mcg/kg/min × 200.9 kg  30.1-361.6 mL/hr  Intravenous CONTINUOUS 18 0954      18 0907  naproxen (NAPROSYN) tablet 500 mg      500 mg Oral HOLD 18 0907      18 0800  [Auto Hold]  vancomycin (VANCOCIN) 2,000 mg in sodium chloride 0.9 % 500 mL intermittent infusion     (Auto Hold since 18 0950)    2,000 mg  over 2 Hours Intravenous EVERY 12 HOURS 18 0148      18 0130  [Auto Hold]  piperacillin-tazobactam (ZOSYN) 4.5 g vial to attach to  mL bag     (Auto Hold since 18 0950)    4.5 g  over 30 Minutes Intravenous EVERY 6 HOURS 18 0033            Contrast Orders - past 72 hours     None                Plan:  1.  Change from vanco 2 g IV q12h to vanco 2 g IV q8h.   2.  Goal Trough  Level: 15-20 mg/L   3.  Pharmacy will check trough levels as appropriate in 1-3 Days.  Check level tomorrow AM prior to 4th dose.   4. Serum creatinine levels will be ordered daily for the first week of therapy and at least twice weekly for subsequent weeks.    5. [place] method utilized to dose vancomycin therapy: Method 1- Patient has a BMI of 61, weight >150 kg, CrCl >70 mL/min, and trough goal of 15-20 for abscess. Discussed with preceptor Dr. Rizzo for new dosing plan.     Sana Horton, PharmD IV student

## 2018-11-07 ENCOUNTER — APPOINTMENT (OUTPATIENT)
Dept: CT IMAGING | Facility: CLINIC | Age: 48
DRG: 625 | End: 2018-11-07
Attending: INTERNAL MEDICINE
Payer: MEDICARE

## 2018-11-07 ENCOUNTER — APPOINTMENT (OUTPATIENT)
Dept: SPEECH THERAPY | Facility: CLINIC | Age: 48
DRG: 625 | End: 2018-11-07
Attending: FAMILY MEDICINE
Payer: MEDICARE

## 2018-11-07 LAB
ANION GAP SERPL CALCULATED.3IONS-SCNC: 10 MMOL/L (ref 3–14)
ANISOCYTOSIS BLD QL SMEAR: SLIGHT
BASOPHILS # BLD AUTO: 0 10E9/L (ref 0–0.2)
BASOPHILS NFR BLD AUTO: 0 %
BUN SERPL-MCNC: 21 MG/DL (ref 7–30)
CALCIUM SERPL-MCNC: 8.4 MG/DL (ref 8.5–10.1)
CEA SERPL-MCNC: <0.5 UG/L (ref 0–2.5)
CHLORIDE SERPL-SCNC: 103 MMOL/L (ref 94–109)
CO2 SERPL-SCNC: 25 MMOL/L (ref 20–32)
CREAT SERPL-MCNC: 0.71 MG/DL (ref 0.52–1.04)
CRP SERPL-MCNC: 41 MG/L (ref 0–8)
DIFFERENTIAL METHOD BLD: ABNORMAL
EOSINOPHIL # BLD AUTO: 0 10E9/L (ref 0–0.7)
EOSINOPHIL NFR BLD AUTO: 0 %
ERYTHROCYTE [DISTWIDTH] IN BLOOD BY AUTOMATED COUNT: 15.9 % (ref 10–15)
GFR SERPL CREATININE-BSD FRML MDRD: 88 ML/MIN/1.7M2
GLUCOSE SERPL-MCNC: 119 MG/DL (ref 70–99)
HCT VFR BLD AUTO: 41.1 % (ref 35–47)
HGB BLD-MCNC: 12.6 G/DL (ref 11.7–15.7)
LYMPHOCYTES # BLD AUTO: 2.2 10E9/L (ref 0.8–5.3)
LYMPHOCYTES NFR BLD AUTO: 10.4 %
MACROCYTES BLD QL SMEAR: PRESENT
MCH RBC QN AUTO: 28.4 PG (ref 26.5–33)
MCHC RBC AUTO-ENTMCNC: 30.7 G/DL (ref 31.5–36.5)
MCV RBC AUTO: 93 FL (ref 78–100)
MONOCYTES # BLD AUTO: 0.5 10E9/L (ref 0–1.3)
MONOCYTES NFR BLD AUTO: 2.6 %
NEUTROPHILS # BLD AUTO: 18.4 10E9/L (ref 1.6–8.3)
NEUTROPHILS NFR BLD AUTO: 87 %
PLATELET # BLD AUTO: 346 10E9/L (ref 150–450)
PLATELET # BLD EST: ABNORMAL 10*3/UL
POTASSIUM SERPL-SCNC: 4.2 MMOL/L (ref 3.4–5.3)
RBC # BLD AUTO: 4.43 10E12/L (ref 3.8–5.2)
SODIUM SERPL-SCNC: 137 MMOL/L (ref 133–144)
WBC # BLD AUTO: 21.1 10E9/L (ref 4–11)

## 2018-11-07 PROCEDURE — 36415 COLL VENOUS BLD VENIPUNCTURE: CPT | Performed by: SURGERY

## 2018-11-07 PROCEDURE — A9270 NON-COVERED ITEM OR SERVICE: HCPCS | Mod: GY | Performed by: STUDENT IN AN ORGANIZED HEALTH CARE EDUCATION/TRAINING PROGRAM

## 2018-11-07 PROCEDURE — 25000128 H RX IP 250 OP 636: Performed by: STUDENT IN AN ORGANIZED HEALTH CARE EDUCATION/TRAINING PROGRAM

## 2018-11-07 PROCEDURE — 86140 C-REACTIVE PROTEIN: CPT | Performed by: SURGERY

## 2018-11-07 PROCEDURE — 92526 ORAL FUNCTION THERAPY: CPT | Mod: GN

## 2018-11-07 PROCEDURE — 70491 CT SOFT TISSUE NECK W/DYE: CPT

## 2018-11-07 PROCEDURE — 80048 BASIC METABOLIC PNL TOTAL CA: CPT | Performed by: SURGERY

## 2018-11-07 PROCEDURE — 25000128 H RX IP 250 OP 636: Performed by: RADIOLOGY

## 2018-11-07 PROCEDURE — 25000132 ZZH RX MED GY IP 250 OP 250 PS 637: Mod: GY | Performed by: STUDENT IN AN ORGANIZED HEALTH CARE EDUCATION/TRAINING PROGRAM

## 2018-11-07 PROCEDURE — 25000125 ZZHC RX 250: Performed by: STUDENT IN AN ORGANIZED HEALTH CARE EDUCATION/TRAINING PROGRAM

## 2018-11-07 PROCEDURE — 82308 ASSAY OF CALCITONIN: CPT | Performed by: SURGERY

## 2018-11-07 PROCEDURE — 25000128 H RX IP 250 OP 636: Performed by: FAMILY MEDICINE

## 2018-11-07 PROCEDURE — 85025 COMPLETE CBC W/AUTO DIFF WBC: CPT | Performed by: SURGERY

## 2018-11-07 PROCEDURE — 40000141 ZZH STATISTIC PERIPHERAL IV START W/O US GUIDANCE

## 2018-11-07 PROCEDURE — 92610 EVALUATE SWALLOWING FUNCTION: CPT | Mod: GN

## 2018-11-07 PROCEDURE — 99223 1ST HOSP IP/OBS HIGH 75: CPT | Mod: GC | Performed by: INTERNAL MEDICINE

## 2018-11-07 PROCEDURE — 12000008 ZZH R&B INTERMEDIATE UMMC

## 2018-11-07 PROCEDURE — 40000225 ZZH STATISTIC SLP WARD VISIT

## 2018-11-07 PROCEDURE — 0CJS8ZZ INSPECTION OF LARYNX, VIA NATURAL OR ARTIFICIAL OPENING ENDOSCOPIC: ICD-10-PCS | Performed by: OTOLARYNGOLOGY

## 2018-11-07 PROCEDURE — 82378 CARCINOEMBRYONIC ANTIGEN: CPT | Performed by: SURGERY

## 2018-11-07 PROCEDURE — 25000125 ZZHC RX 250: Performed by: RADIOLOGY

## 2018-11-07 RX ORDER — CEFTRIAXONE 2 G/1
2 INJECTION, POWDER, FOR SOLUTION INTRAMUSCULAR; INTRAVENOUS ONCE
Status: COMPLETED | OUTPATIENT
Start: 2018-11-08 | End: 2018-11-08

## 2018-11-07 RX ORDER — CEFTRIAXONE 1 G/1
1 INJECTION, POWDER, FOR SOLUTION INTRAMUSCULAR; INTRAVENOUS EVERY 12 HOURS
Status: COMPLETED | OUTPATIENT
Start: 2018-11-07 | End: 2018-11-07

## 2018-11-07 RX ORDER — IOPAMIDOL 755 MG/ML
100 INJECTION, SOLUTION INTRAVASCULAR ONCE
Status: COMPLETED | OUTPATIENT
Start: 2018-11-07 | End: 2018-11-07

## 2018-11-07 RX ADMIN — ACETAMINOPHEN 650 MG: 325 TABLET, FILM COATED ORAL at 12:46

## 2018-11-07 RX ADMIN — SODIUM CHLORIDE, POTASSIUM CHLORIDE, SODIUM LACTATE AND CALCIUM CHLORIDE: 600; 310; 30; 20 INJECTION, SOLUTION INTRAVENOUS at 03:31

## 2018-11-07 RX ADMIN — PHENYLEPHRINE HYDROCHLORIDE 5 ML: 10 INJECTION, SOLUTION INTRAMUSCULAR; INTRAVENOUS; SUBCUTANEOUS at 14:45

## 2018-11-07 RX ADMIN — CEFTRIAXONE 1 G: 1 INJECTION, POWDER, FOR SOLUTION INTRAMUSCULAR; INTRAVENOUS at 21:48

## 2018-11-07 RX ADMIN — ASPIRIN 325 MG ORAL TABLET 325 MG: 325 PILL ORAL at 12:46

## 2018-11-07 RX ADMIN — ALLOPURINOL 500 MG: 100 TABLET ORAL at 12:50

## 2018-11-07 RX ADMIN — SODIUM CHLORIDE, PRESERVATIVE FREE 60 ML: 5 INJECTION INTRAVENOUS at 12:17

## 2018-11-07 RX ADMIN — AMLODIPINE BESYLATE 5 MG: 5 TABLET ORAL at 12:46

## 2018-11-07 RX ADMIN — CEFTRIAXONE 1 G: 1 INJECTION, POWDER, FOR SOLUTION INTRAMUSCULAR; INTRAVENOUS at 08:04

## 2018-11-07 RX ADMIN — VITAMIN D, TAB 1000IU (100/BT) 2000 UNITS: 25 TAB at 12:46

## 2018-11-07 RX ADMIN — IOPAMIDOL 100 ML: 755 INJECTION, SOLUTION INTRAVENOUS at 12:17

## 2018-11-07 ASSESSMENT — ACTIVITIES OF DAILY LIVING (ADL)
ADLS_ACUITY_SCORE: 11

## 2018-11-07 NOTE — PLAN OF CARE
Problem: Patient Care Overview  Goal: Plan of Care/Patient Progress Review  Outcome: No Change  [id number]    Neuro: A&Ox4. Uses call light appropriately.  Cardiac: SR 80-90. VSS.   Respiratory: Sating mid 90s on 3L O2. Currently wearing home CPAP.  GI/: One unmeasured void. Patient removed measuring hat from the bathroom.  Diet/appetite: NPO except meds.  Activity:  Up to chair and bathroom independently.  Pain: Currently denies pain.  Skin: Anterior neck incision with glue, CDI.  LDA's: R PIV x2 - MIVF infusing.    Plan: Continue with POC. Notify primary team with changes.

## 2018-11-07 NOTE — PLAN OF CARE
Problem: Patient Care Overview  Goal: Plan of Care/Patient Progress Review  Discharge Planner PT   Patient plan for discharge: Unknown  Current status: Clinical swallow eval completed per MD order. Pt presents with mild oropharyngeal dysphagia in setting of large mediastinal mass. Oral mech exam unremarkable. Pt assessed with thin liquids, nectar-thick liquids, puree, and solids textures. Oral phase WFL for all consistencies. Pharyngeal phase WFL for small sips of liquids. Significant, overt s/sx of aspiration (strong, immediate cough) with large sips of liquids; thickened liquids did not eliminate s/sx. Recommend regular diet/thin liquids via single, small sips only. Should s/sx of aspiration persist despite compensatory swallowing strategies, instrumental swallow study (VFSS) may be indicated. Pt to sit upright for all PO and eat/drink at slow rate. SLP to follow.  Barriers to return to prior living situation: Dysphagia  Recommendations for discharge: Home with OP ST  Rationale for recommendations: Below baseline swallow function       Entered by: Jackie Burger 11/07/2018 11:00 AM

## 2018-11-07 NOTE — PROGRESS NOTES
11/07/18 1000   General Information   Onset Date 11/05/18   Start of Care Date 11/07/18   Referring Physician Ankush Fuller MD   Patient Profile Review/OT: Additional Occupational Profile Info See Profile for full history and prior level of function   Patient/Family Goals Statement None stated   Swallowing Evaluation Bedside swallow evaluation   Behaviorial Observations WFL (within functional limits)   Mode of current nutrition NPO   Respiratory Status Room air   Comments SLP: Pt is a 47 year old female with a PMHx of a stroke (2011), HTN, prediabetes, obesity, FRANCESCA, fatty liver, gout, nocturia, hematuria, kidney stones, and a herniated cervical disc, who is admitted for further evaluation of a large mediastinal mass noted on CT. Mass was observed to be displacing and narrowing the trachea. Pt is a competitive eater, and began noticing odynophagia/dysphagia/dysphonia after a large meal on Saturday. Upon admit to [facility], pt found to have positive rapid strep test. Transcervical mediastinal exploration completed 11/6 at [facility], pathology pending. Awaiting results of ENT consult to determine eitology of dysphonia. Pt reports tolerating regular diet/thin liquids prior to ED admission, and noticed swallowing difficulties only at that point. Clinical swallow eval completed per MD order.    Clinical Swallow Evaluation   Oral Musculature generally intact   Dentition present and adequate   Mucosal Quality good   Mandibular Strength and Mobility intact   Oral Labial Strength and Mobility WFL   Lingual Strength and Mobility WFL   Velar Elevation intact   Buccal Strength and Mobility intact   Laryngeal Function Cough;Throat clear;Swallow;Voicing initiated  (Voicing noted to be breathy with reduced intensity)   Clinical Swallow Eval: Thin Liquid Texture Trial   Mode of Presentation, Thin Liquids cup;straw;self-fed   Volume of Liquid or Food Presented 6oz water   Oral Phase of Swallow WFL   Pharyngeal Phase of Swallow  impaired;coughing/choking   Successful Strategies Trialed During Procedure (Controlled bolus amount eliminated s/sx of aspiration)   Diagnostic Statement Oral phase WFL. Overt s/sx of aspiration (immediate, strong cough) with large bolus. Overt s/sx of aspiration eliminated with controlled sips.    Clinical Swallow Eval: Nectar Thick Liquid Texture Trial   Mode of Presentation, Nectar cup;self-fed   Volume of Nectar Presented 2 oz thickened apple juice   Oral Phase, [place] WFL   Pharyngeal Phase, Nectar impaired;coughing/choking   Diagnostic Statement Oral phase WFL. Overt s/sx of aspiration with large boluses. S/sx eliminated with small sips.    Clinical Swallow Eval: Puree Solid Texture Trial   Mode of Presentation, Puree spoon;self-fed   Volume of Puree Presented 4 oz pudding   Oral Phase, Puree WFL   Pharyngeal Phase, Puree intact   Diagnostic Statement Oral phase WFL. No overt s/sx of aspiration.   Clinical Swallow Eval: Solid Food Texture Trial   Mode of Presentation, Solid self-fed   Volume of Solid Food Presented 1 cracker   Oral Phase, Solid WFL   Pharyngeal Phase, Solid intact   Diagnostic Statement Oral phase WFL. No overt s/sx of aspiration.   Swallow Compensations   Swallow Compensations Reduce amounts   Results No difficulties noted   Esophageal Phase of Swallow   Patient reports or presents with symptoms of esophageal dysphagia No   General Therapy Interventions   Planned Therapy Interventions Dysphagia Treatment   Dysphagia treatment Modified diet education;Instruction of safe swallow strategies;Compensatory strategies for swallowing   Swallow Eval: Clinical Impressions   Skilled Criteria for Therapy Intervention Skilled criteria met.  Treatment indicated.   Functional Assessment Scale (FAS) 5   Treatment Diagnosis Mild oropharyngeal dysphagia    Diet texture recommendations Regular diet;Thin liquids  (Liquid via small sips ONLY)   Recommended Feeding/Eating Techniques small sips/bites  (Upright at  90 degrees for all PO)   Demonstrates Need for Referral to Another Service dietitian   Therapy Frequency 5 times/wk   Predicted Duration of Therapy Intervention (days/wks) 1 week   Anticipated Discharge Disposition home   Risks and Benefits of Treatment have been explained. Yes   Patient, family and/or staff in agreement with Plan of Care Yes   Clinical Impression Comments SLP: Clinical swallow eval completed per MD order. Pt presents with mild oropharyngeal dysphagia in setting of large mediastinal mass. Oral mech exam unremarkable. Pt assessed with thin liquids, nectar-thick liquids, puree, and solids textures. Oral phase WFL for all consistencies. Pharyngeal phase WFL for small sips of liquids. Significant, overt s/sx of aspiration (strong, immediate cough) with large sips of liquids; thickened liquids did not eliminate s/sx. Recommend regular diet/thin liquids via single, small sips only. Should s/sx of aspiration persist despite compensatory swallowing strategies, instrumental swallow study (VFSS) may be indicated. Pt to sit upright for all PO and eat/drink at slow rate. SLP to follow.   Total Evaluation Time   Total Evaluation Time (Minutes) 18

## 2018-11-07 NOTE — SUMMARY OF CARE
Patient belongings include: Big red suitcase, c-pap machine, laptop, pink tank top, sweatpants, earrings and purse.

## 2018-11-07 NOTE — CONSULTS
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  She is planning on discharging and returning home to [place] and seeking remainder of her workup and cares at [facility] or [facility], both which have been confirmed to use Epic EMR.      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  Final path pending. Neck CT shows L paratracheal mass originating from thyroid. Thoracic surgery and ENT continues to follow. In discussion with Ms. Hurst, she states that she would like to return to [place] for further treatment, as that is where she obtains all of her medical care ([facility] or [facility]). Because she is planning to obtain her care their, the Oncology consult team can provide initial recommendations, such as need for PET/CT study, but this will not be completed during this current hospitalization. There is a possibility this may be genetic cancer syndrome (MEN2A/B) and she will require further workup for associated malignancies (including pheochromocytoma and hyperparathyroidism) in addition to staging.  There is not a strong family history of malignancy, but nevertheless, these should be checked.  She will require both ENT and medical oncology follow-up on return to [place], which she will be responsible for scheduling on return home.  She is agreeable to this  and is requesting information to be provided on discharge to bring with her to appointments to facilitate transfer of cares.      Summary of Recommendations:  - f/u final pathology  - patient's home hospital to request records for continuity of care. Please sent imaging on disc with patient on discharge.    - will need to determine where to send slides, as [facility] system will likely want to confirm pathology.   - Will need to check CEA, calcitonin, plasma fractionated metanephrines, and 24 hour urine metanephrines either at this admission or at [facility] to evaluate for possible MEN2 syndromes.    - PET on discharge   - ENT and medical oncology follow-up in [place] within 1-2 weeks of discharge.      Thank you for involving us in the care of this patient. We will continue to follow during the hospitalization.    Patient seen and discussed with Dr. Prado.     Nba Varghese MD, PhD  Internal Medicine, PGY-1  Pager: [phone]  11/07/2018    Updated by Gray Mcguire 11/8/18.           History of Present Illness:   History obtained from chart review and confirmed with patient.    Carol Hurst is a 47 year old with history of stroke (2011), Factor V Leiden, hypertension, impaired fasting glucose, obesity, FRANCESCA, fatty liver disease, gout, nephrolithiasis, and herniated cervical disc who was admitted on 11/6/2018 due to mediastinal mass. She is in the area currently completing a road trip. The patient initially presented to [facility] ED due to sudden onset sore throat, fever, headache, chest discomfort, dysphagia to liquids, and hoarseness that began on 11/7/2018. In the ED, she had a positive rapid strep test and amoxicillin was initiated. Imaging showed a L mediastinal mass, concerning for esophageal rupture vs. abscess. She was transferred to [facility] for further care, where antibiotics were transitioned to vancomycin/zosyn and subsequently IV ceftriaxone. The patient underwent EGD,  bronchoscopy, mediastinoscopy, with cervical lymph node biopsy by Thoracic Surgery. Prelim biopsy results showed carcinoma with neuroendocrine morphology, likely thyroid. ENT recommended CT neck with contrast. US is deferred at this time.   Upon interview today, the patient states that she has been doing fairly well from a health standpoint. She has had intermittent dyspnea that has been attributed to her obesity. She also reports occasional cough, that is associated with trigger, such as smoke and food aspiration. She does state that she was being followed by a Heme/Onc physician due to leukocytosis that has been present for several years. Workup is currently in process. She denied ongoing chills, night sweats, unexpected weight loss, bruising, adenopathy or skin lesions.  Since undergoing several procedures yesterday, the patient states that her hoarseness is slightly improved.       Review of Systems:   A comprehensive ROS was performed with the patient and was found to be negative with the exception of that noted in the HPI above.       Past Medical History:     Past Medical History:   Diagnosis Date     Cerebral infarction (H) 2011    Mild right hand deficits, coordination      Fatty liver      Gout      Herniated cervical disc      Hypertension      Leukocytosis, unspecified type     Patient states ongoing     Nocturia      Obesity      FRANCESCA (obstructive sleep apnea)      Prediabetes             Past Surgical History:     Past Surgical History:   Procedure Laterality Date     BIOPSY LYMPH NODE CERVICAL  11/6/2018    Procedure: BIOPSY LYMPH NODE CERVICAL;  Surgeon: Bismark Sewell MD;  Location: [facility] OR     BRONCHOSCOPY (RIGID OR FLEXIBLE), DIAGNOSTIC N/A 11/6/2018    Procedure: BRONCHOSCOPY (RIGID OR FLEXIBLE), DIAGNOSTIC;  Surgeon: Bismark Sewell MD;  Location: [facility] OR     ESOPHAGOSCOPY, GASTROSCOPY, DUODENOSCOPY (EGD), COMBINED N/A 11/6/2018    Procedure: COMBINED ESOPHAGOSCOPY, GASTROSCOPY,  "DUODENOSCOPY (EGD);  Surgeon: Bismark Sewell MD;  Location: [facility] OR     TRANSCERVICAL EXTENDED MEDIASTINAL LYMPHADENECTOMY N/A 11/6/2018    Procedure: Transcervical  Cutdown, Biopsy of Medialstinal Mass, Esophagogastroduodenoscopy, Bronchoscopy.  ;  Surgeon: Bismark Sewell MD;  Location: [facility] OR            Social History:     Social History     Social History     Marital status: Single     Spouse name: N/A     Number of children: N/A     Years of education: N/A     Occupational History     Not on file.     Social History Main Topics     Smoking status: Never Smoker     Smokeless tobacco: Never Used     Alcohol use No     Drug use: No     Sexual activity: Not on file     Other Topics Concern     Not on file     Social History Narrative            Family History:     Family History   Problem Relation Age of Onset     Colon Cancer Mother      Patient denies other history of malignancy in family members.         Allergies:     Allergies   Allergen Reactions     Ace Inhibitors      Haldol [Haloperidol]      Serotonin Reuptake Inhibitors      Thorazine [Chlorpromazine]             Medications:     Prescriptions Prior to Admission   Medication Sig Dispense Refill Last Dose     ALLOPURINOL PO Take 300 mg by mouth daily Patient takes 500mg total daily, 1 300mg tablet and 2 100mg tabled once daily        AMLODIPINE BESYLATE PO Take 5 mg by mouth daily        ASPIRIN PO Take 325 mg by mouth daily         NAPROXEN PO Take 500 mg by mouth 2 times daily as needed for moderate pain        POTASSIUM CITRATE PO Take 15 mEq by mouth 2 times daily        VITAMIN D, CHOLECALCIFEROL, PO Take 2,000 Units by mouth daily               Physical Exam:   BP (!) 173/108 (BP Location: Left arm)  Pulse 94  Temp 97  F (36.1  C) (Oral)  Resp 18  Ht 1.803 m (5' 11\")  SpO2 97%  BMI 61.79 kg/m2  GENERAL: well-appearing, alert and no distress, sitting at edge of bed  EYES: EOMI, sclera-nonicteric  HENT: oropharynx without ulcers or " lesions  NECK: mediastinoscopy incision scar is C/D/I, no asymmetry, masses, or scars  RESP: normal respiratory effort on room air, rare wheeze noted, otherwise CTAB  CV: heart sounds distant, regular rates and rhythm, normal S1 S2, no S3 or S4, no murmur, click or rub; posterior tibial pulses +2 bilaterally  ABDOMEN: normoactive bowel sounds, soft, nontender, no HSM or masses   MS/Ext: spontaneous movement of all extremities  SKIN: no suspicious lesions or rashes  NEURO: AOx3, patient speaks in hoarse voice  PSYCH: interactive, speech normal, mentation and judgment appear normal  LYMPHATICS: no cervical or supraclavicular adenopathy         Data:   I have personally reviewed the following labs/imaging:  CBC  Recent Labs  Lab 11/07/18  0605 11/06/18  0000 11/05/18  1351   WBC 21.1* 16.0* 16.0*   RBC 4.43 4.61 4.72   HGB 12.6 13.1 13.3   HCT 41.1 42.5 42.5   MCV 93 92 90   MCH 28.4 28.4 28.2   MCHC 30.7* 30.8* 31.3*   RDW 15.9* 16.0* 15.9*    340 330     CMP  Recent Labs  Lab 11/07/18  0605 11/06/18  0000 11/05/18  1351    138 137   POTASSIUM 4.2 4.5 3.9   CHLORIDE 103 104 102   CO2 25 23 29   ANIONGAP 10 11 6   * 154* 84   BUN 21 21 19   CR 0.71 0.86 0.85   GFRESTIMATED 88 70 71   GFRESTBLACK >90 85 86   DIOMEDES 8.4* 8.7 8.7     INRNo lab results found in last 7 days.     Pathology, preliminary results 11/6/18  PRELIMINARY  INTRAOPERATIVE DIAGNOSIS   PRELIMINARY INTRAOPERATIVE FROZEN SECTION DIAGNOSIS:   AFS1. Upper mediastinal mass:   - Carcinoma with neuroendocrine morphology, favor thyroid origin

## 2018-11-07 NOTE — PLAN OF CARE
"Problem: Patient Care Overview  Goal: Plan of Care/Patient Progress Review  Outcome: Therapy, progress towards functional goals is fair  BP (!) 147/94 (BP Location: Left arm)  Pulse 94  Temp 98.3  F (36.8  C) (Oral)  Resp 18  Ht 1.803 m (5' 11\")  SpO2 92%  BMI 61.79 kg/m2  Neuro: A&Ox4.   Cardiac: VSS.   Respiratory: RA   GI/: Voiding spontaneously. No BM this shift.   Diet/appetite: Tolerating diet. Hoarse voice, pt is to go slow and eat small bites of food.Denies nausea   Activity: Up independently.  Pain: Denies   Skin: Intact, no new deficits noted.  Lines: PIV  Drains: None    Oncology to see pt , Will continue to monitor and follow plan of care.           "

## 2018-11-07 NOTE — PROGRESS NOTES
"THORACIC SURGERY PROGRESS NOTE    S:  No acute overnight events.  Pt seen at bedside resting comfortably.  Does complain of appropriate incisional pain.  Otherwise no acute complaints.  Voiding, ambulating.     O:  BP (!) 159/100 (BP Location: Left arm)  Pulse 94  Temp 98.8  F (37.1  C) (Axillary)  Resp 18  Ht 1.803 m (5' 11\")  SpO2 92%  BMI 61.79 kg/m2      A&Ox3, NAD  Breathing non-labored on 3 L NC, voice still hoarse  RRR  Soft, NDNT,   Distal extremities warm. Well perfused    Incisions C/D/I with glue in place    Labs  WBC 21 (16), Hgb 12.6 (13), Cr 0.7  Imaging  Planning for CT neck     A/P: Carol Hurst is a 47 year old female POD# 1 s/p transcervical mediastinal exploration, discovered to have mediastinal mass, pathology pending. Overall recovering well.    - Continue care per primary team  - Agree with ENT consult / management of likely medullary / follicular cancer  - For further workup of mass, recommend delayng formal ultrasound until air artifact from surgery has resolved, likely a few days.   - Call with questions     Vu Alvarez MD  General Surgery, PGY-1  Pg [phone]    "

## 2018-11-07 NOTE — PLAN OF CARE
Problem: Patient Care Overview  Goal: Plan of Care/Patient Progress Review  Outcome: No Change  A/O X4. Able to make needs known, uses call light appropriately. HR 80-90, VSS; afebrile; pt on home cpap over night. Pt refusing to measure urine output. Up SBA to bathroom. Neck incision WDL, TERESSA . C/o no pain. Will continue to monitor and follow plan of care.

## 2018-11-07 NOTE — CONSULTS
Otolaryngology Consult Note  2018      CC: Mediastinal mass    HPI: Carol Hurst is a 47 year old female with a past medical history of stroke (), HTN, prediabetes, obesity, FRANCESCA, fatty liver, gout, kidney stones, herniated cervical disc who is admitted for new onset dysphagia, sore throat and hoarseness with mediastinal mass on CT. There was concern for mediastinal abscess due to esophageal rupture given her history of competitive eating and onset of symptoms occurring after eating a large meal. She was also found to have a positive rapid strep test with elevated WBC. She was taken to the OR urgently yesterday for I&D of abscess, however, intra-operative findings were mass with no evidence of infection. Preliminary biopsies were concerning for carcinoma with neuroendocrine morphology, likely from thyroid origin. ENT was consulted intra-operatively and no further acute intervention was recommended, neck/chest was closed.     Today she continues to endorse some pain with swallowing, breathy voice and increased SOB from baseline. She had a swallow study performed today and noted coughing and choking while trying to drink certain liquids. She denies fevers, chills and is otherwise doing well. Over the past few months she does not report any hot flashes, chills, palpitations, constipation, diarrhea, swelling in neck, or excessive weight gain or loss. She does note that she has gained 10 lbs over the past few months but contributes this to her competitive eating.     The patient denies a hx of thyroid issues personally or within her family. Per patient she gets regular physicals and her thyroid levels have always been within normal limits. She denies a personal hx of cancer but endorses cancer in both her parents. Her mother  in her 60s of colon cancer. She notes being estranged from her father but knows that he  of some kind of cancer. No history of radiation to the neck and no neck  "procedure before her biopsy yesterday. She has met with an ENT on one other occasion in August of 2017. She did not know exactly what was done but was seen for transgender services and knows she had a \"2 hr appointment where they took a look at her vocal cords with a camera\". She endorses taking hormone replacement medication from the mid 90s until roughty 2007. She has not had any transgender procedures performed at this time but notes it was something she was considering for the future.       Past Medical History:   Diagnosis Date     Cerebral infarction (H) 2011    Mild right hand deficits, coordination      Fatty liver      Gout      Herniated cervical disc      Hypertension      Leukocytosis, unspecified type     Patient states ongoing     Nocturia      Obesity      FRANCESCA (obstructive sleep apnea)      Prediabetes        Past Surgical History:   Procedure Laterality Date     BIOPSY LYMPH NODE CERVICAL  11/6/2018    Procedure: BIOPSY LYMPH NODE CERVICAL;  Surgeon: Bismark Sewell MD;  Location: [facility] OR     BRONCHOSCOPY (RIGID OR FLEXIBLE), DIAGNOSTIC N/A 11/6/2018    Procedure: BRONCHOSCOPY (RIGID OR FLEXIBLE), DIAGNOSTIC;  Surgeon: Bismark Sewell MD;  Location: [facility] OR     ESOPHAGOSCOPY, GASTROSCOPY, DUODENOSCOPY (EGD), COMBINED N/A 11/6/2018    Procedure: COMBINED ESOPHAGOSCOPY, GASTROSCOPY, DUODENOSCOPY (EGD);  Surgeon: Bismark Sewell MD;  Location: [facility] OR     TRANSCERVICAL EXTENDED MEDIASTINAL LYMPHADENECTOMY N/A 11/6/2018    Procedure: Transcervical  Cutdown, Biopsy of Medialstinal Mass, Esophagogastroduodenoscopy, Bronchoscopy.  ;  Surgeon: Bismark Sewell MD;  Location: [facility] OR       No current outpatient prescriptions on file.          Allergies   Allergen Reactions     Ace Inhibitors      Haldol [Haloperidol]      Serotonin Reuptake Inhibitors      Thorazine [Chlorpromazine]        Social History     Social History     Marital status: Single     Spouse name: N/A     Number of " "children: N/A     Years of education: N/A     Occupational History     Not on file.     Social History Main Topics     Smoking status: Never Smoker     Smokeless tobacco: Never Used     Alcohol use No     Drug use: No     Sexual activity: Not on file     Other Topics Concern     Not on file     Social History Narrative       Family History   Problem Relation Age of Onset     Colon Cancer Mother        ROS: 12 point review of systems is negative unless noted in HPI.    PHYSICAL EXAM:  General: laying in bed, no acute distress  BP (!) 147/94 (BP Location: Left arm)  Pulse 94  Temp 98.3  F (36.8  C) (Oral)  Resp 18  Ht 1.803 m (5' 11\")  SpO2 92%  BMI 61.79 kg/m2  HEAD: normocephalic, atraumatic  Face: symmetrical, CN VII intact bilaterally (HB 1), Sensation V1-V3 intact and equal bilaterally.   Eyes: EOMI without spontaneous or gaze evoked nystagmus, PERRL, clear sclera  Ears: no tragal tenderness, external ear canal open and clear bilaterally  Nose: no anterior drainage, intact and midline septum without perforation or hematoma   Mouth: symmetrical rise in palate, oral airway open, uvula midline, no obvious erythema or exudate noted   Neck: limited due to body habitus, neck obese without palpable LAD, thyroid non palpable, biopsy incision clean, dry and intact, healing well without signs of infection.   Neuro: cranial nerves 2-12 grossly intact  Respiratory: breathing non-labored on RA, no stridor  Skin: no rashes or skin lesions of the face, neck incision described above   Psych: pleasant affect  Cardio: extremities warm and well perfused     FIBEROPTIC ENDOSCOPY:  Due to hoarsenss and mediastinal mass, fiberoptic laryngoscopy was indicated. After obtaining verbal consent, the nose was topically decongested and anesthetized. The fiberoptic laryngoscope was passed under endoscopic vision through the right nasal passage. The turbinates were normal. The inferior and middle meati were clear bilaterally without " purulence, masses, or polyps. The nasopharynx was clear. The eustachian tubes were clear. The soft palate appeared normal with good mobility. The epiglottis was sharp, and the visualized portion of the vallecula was clear. The right cord moved appropriately reaching midline. The left cord was non-mobile. A glottic gap was present.The arytenoids were clear, and there was no pooling in the hypopharynx.    ROUTINE IP LABS (Last four results)  BMP  Recent Labs  Lab 11/07/18  0605 11/06/18  0000 11/05/18  1351    138 137   POTASSIUM 4.2 4.5 3.9   CHLORIDE 103 104 102   DIOMEDES 8.4* 8.7 8.7   CO2 25 23 29   BUN 21 21 19   CR 0.71 0.86 0.85   * 154* 84     CBC  Recent Labs  Lab 11/07/18  0605 11/06/18  0000 11/05/18  1351   WBC 21.1* 16.0* 16.0*   RBC 4.43 4.61 4.72   HGB 12.6 13.1 13.3   HCT 41.1 42.5 42.5   MCV 93 92 90   MCH 28.4 28.4 28.2   MCHC 30.7* 30.8* 31.3*   RDW 15.9* 16.0* 15.9*    340 330     INRNo lab results found in last 7 days.    Imaging:  CT soft tissue neck   IMPRESSION:  1. Left paratracheal mass, likely originating from the inferior aspect  of the left thyroid lobe, consistent with known neuroendocrine  carcinoma. No evidence of metastatic disease in the neck.  2. Expected recent post surgical changes of mediastinoscopy      Assessment and Plan  Carol Hurst is a 47 year old female with a past medical history as above admitted for new onset dysphagia, sore throat and hoarseness with mediastinal mass on CT. Now POD#1 s/p open biopsies of the mass by thoracic surgery. Preliminary pathology concerning for medullary vs follicular thyroid carcinoma. Endoscopy demonstrating left vocal cord hypomobility. No prior studies to compare      - CT neck reviewed shows mass likely from thyroid origin   - Will likely need surgical resection based on pathology - patient expressed interest during interview of obtaining further management of mass in [place]   - Diet per speech   - Can follow up  as an outpatient with ENT for dysphonia and vocal cord weakness  - TSH, T4 (ordered for you)   - F/u pathology   - Additional cares per primary team     Robe Murrell PGY1   Otolaryngology-Head & Neck Surgery  Please page ENT with questions by dialing [phone] and entering job code [phone] when prompted.

## 2018-11-07 NOTE — CONSULTS
THORACIC SURGERY CONSULT NOTE    Consult Reason: Large left mediastinal mass, concern for complex fluid collection/abscess, esophageal or lymph node mass. Patient is competitive eater.    HPI: Carol Hurst is a 47 year old female with a PMHx of a stroke (2011), HTN, prediabetes, obesity, FRANCESCA, fatty liver, gout, nocturia, hematuria, kidney stones, and a herniated cervical disc, who is admitted for further evaluation of a large mediastinal mass noted on CT.     Patient presented to [facility] ED with a 1 day history of sore throat, difficulty swallowing, and was losing her voice. 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 Her last meal was Saturday around 1pm which was 2 large hamburgers and fries. The sore throat and difficulty swallowing started almost immediately thereafter. She states that it was more difficult to swallow liquids that solids after this, and complained of subjective fever, chills, headache, and cough. She tested positive for strep in the [facility] ED where her temp was 100.1. She was transferred to [facility] for definitive care, although she was reluctant to do this given that she is on a competitive eating roadtrip and is from [place].    A/P: Patient is a 47 year old female with mediastinal mass, favored esophageal perforation given history of competitive eating, onset of dysphagia and throat pain immediately following eating hamburgers.   - To OR for transcervical mediastinal abscess drainage  - Management per primary team  - Agree with ENT consult    Patient and plan discussed with attending.    Thank you for the opportunity to participate in the care of this patient.    PMH:  Past Medical History:   Diagnosis Date     Cerebral infarction (H) 2011    Mild right hand deficits, coordination      Fatty liver      Gout      Herniated cervical disc      Hypertension      Leukocytosis, unspecified type     Patient states ongoing     Nocturia      Obesity      FRANCESCA (obstructive  sleep apnea)      Prediabetes        PSH:  History reviewed. No pertinent surgical history.      FH:  family history includes Colon Cancer in her mother.      SH:  No Tobacco use  No EtOH use    Allergies:  Allergies   Allergen Reactions     Ace Inhibitors      Haldol [Haloperidol]      Serotonin Reuptake Inhibitors      Thorazine [Chlorpromazine]        Home Meds:  Prescriptions Prior to Admission   Medication Sig Dispense Refill Last Dose     ALLOPURINOL PO Take 300 mg by mouth daily Patient takes 500mg total daily, 1 300mg tablet and 2 100mg tabled once daily        AMLODIPINE BESYLATE PO Take 5 mg by mouth daily        ASPIRIN PO Take 325 mg by mouth daily         NAPROXEN PO Take 500 mg by mouth 2 times daily as needed for moderate pain        POTASSIUM CITRATE PO Take 15 mEq by mouth 2 times daily        VITAMIN D, CHOLECALCIFEROL, PO Take 2,000 Units by mouth daily          ROS:   Negative unless stated in HPI  - No fever, chills, night sweats  - No palpitations or shortness of breath  - No abdominal pain, nausea, or vomiting  - No dysuria    Physical Exam:  Temp:  [96.7  F (35.9  C)-98.6  F (37  C)] 98  F (36.7  C)  Pulse:  [87-94] 94  Heart Rate:  [88-95] 93  Resp:  [16-22] 16  BP: (107-152)/() 141/94  MAP:  [92 mmHg-95 mmHg] 92 mmHg  Arterial Line BP: (122-124)/(74-83) 124/74  SpO2:  [92 %-96 %] 94 %    Gen: NAD, resting comfortably in bed, voice hoarse  Lungs: CTAB, non-labored breathing on room air  CV: regular rhythm, normal rate  Abd: obese, soft, non-tender, non-distended  Ext: warm, well-perfused  Neuro: AOx3    Labs:  ABG No lab results found in last 7 days.  CBC  Recent Labs  Lab 11/06/18  0000 11/05/18  1351   WBC 16.0* 16.0*   HGB 13.1 13.3    330     BMP  Recent Labs  Lab 11/06/18  0000 11/05/18  1351    137   POTASSIUM 4.5 3.9   CHLORIDE 104 102   CO2 23 29   BUN 21 19   CR 0.86 0.85   * 84     LFTNo lab results found in last 7 days.  PancreasNo lab results found in  last 7 days.    Imaging:  CT Large left mediastinal mass that is ill-defined and immediately  adjacent to the esophagus measuring approximate 5.0 x 3.0 x 2.7 cm in  size. Differential diagnosis includes lymph node mass, esophageal  mass, or complex fluid collection/abscess.      Vu Alvarez MD  General Surgery Resident, PGY-1  [phone]    Addendum:   Taken to OR with Dr. Harding and Dr. Ivy for transcervical mediastinal abscess drainage. Found to have mediastinal mass, no evidence of abscess. Pathology pending    - Transfer to 6B  - ENT consult  - Prelmin DX medullary vs follicular [place]   - ENT to follow   - CEA, calcitonin   - Delay formal US until surgical air artifact resolved (likely a few days)   - NPO  - Swallow eval prior to PO intake due to likely left vocal cord paresis

## 2018-11-07 NOTE — PROGRESS NOTES
[facility], [facility] Progress Note - [facility]       Main Plans for Today   - Oncology to see, awaiting final pathology results  - Plan for neck CT tomorrow (11/8)    Assessment & Plan   Carol Hurst is a 47 year old female admitted on 11/5/2018. She has a PMHx of a stroke (2011), HTN, prediabetes, obesity, FRANCESCA, fatty liver, gout, nocturia, hematuria, kidney stones, and a herniated cervical disc, who was admitted for further evaluation of a large mediastinal mass noted on CT.     # Large left mediastinal mass  # Shortness of breath   # Hoarseness  Biopsy by thoracic surgery suggestive of follicular vs medullary thyroid cancer; awaiting final pathology.  Likely cause of hoarseness with nerve compression/invasion.    - Thoracic surgery following, appreciate recs  - ENT following, appreciate recs  - Neck soft tissue CT with contrast tomorrow (11/8)  - Will try to provide path report and discharge summary to patient to facilitate smooth transition of care to her heme/onc doctor in [place] who does not seem to use our EMR (Epic)    # Strep pharyngitis  May be contributing to hoarse voice and some of throat pain, though also is likely she has pain due to mediastinal mass and compression of trachea, esophagus, and surrounding structures. Will treat for acute Strep infection.  - Continue ceftriaxone IV, switch from 1g q12 hrs to 2g q24 hrs, to finish 3 day course     Chronic medical problems:  # Hx of stroke  - Continue PTA aspirin     # FRANCESCA  - Continue CPAP at night    # HTN  - Continue PTA amlodipine    # Gout  - Resume PTA allopurinol    # Kidney stones  - Hold PTA KCL due to K 4.5    # Pain Assessment:  Current Pain Score 11/7/2018   Patient currently in pain? denies   Pain score (0-10) -   Pain location -   Pain descriptors -   - Carol is experiencing pain due to strep pharyngitis and large mediastinal mass and post operative pain. Pain management was  discussed and the plan was created in a collaborative fashion.  Carol's response to the current recommendations: engaged  - Please see the plan for pain management as documented above    Diet: High Kcal/High Protein Diet, ADULT  Room Service  Fluids: IVL, PO only  DVT Prophylaxis: Pneumatic Compression Devices for now, consider lovenox given malignancy  Code Status: Full Code    Disposition Plan   Expected discharge: 2 - 3 days, recommended to depends on management once adequate pain management/ tolerating PO medications and management plan established. Dispo: Expected Discharge Date: 11/09/18        Entered: Chris Fitzgerald 11/07/2018, 7:16 PM   Information in the above section will display in the discharge planner report.      The patient's care was discussed with the Attending Physician, Dr. Fuller.    Maryana Henry, MS3    I was present with the medical student who participated in the service and in the documentation of this note. I have verified the history and personally performed the physical exam and medical decision making, and have verified the content of the note, which accurately reflects my assessment of the patient and the plan of care.   MD Chris Blue  [facility]  Pager: [phone]  Please see sticky note for cross cover information    Hospital Course  Carol Hurst is a 47 year old female competitive eater from [place] with a PMHx of a stroke (2011), HTN, prediabetes, obesity, FRANCESCA, fatty liver, gout, nocturia, hematuria, kidney stones, and a herniated cervical disc, who was admitted 11/5/18 for further evaluation of a large mediastinal mass noted on CT. She was originally seen at [facility] ED on 11/5/18 with a 1 day history of sore throat, difficulty swallowing, and hoarse voice, which began about 3-4 hours after a meal, as well as subj fever, chills, headache, cough with any liquid ingestion, neck pain, mild SOB, and  epigastric chest pain. In [facility] ED, vitals notable for tachycardia and labs significant for WBC 16.0 and rapid strep test positive. She was started on amoxicillin for strep pharyngitis. Chest XR showed a left superior mediastinal mass displacing and narrowing the trachea. Follow up chest CT showed large left mediastinal mass 5.0x3.0x2.7 cm. Antibiotics switched to vanc/zosyn with concern for esophageal rupture and abscess formation, and thoracic surgery recommended transfer to [facility] for definitive care. Patient almost left AMA, but after several long discussions, she agreed to stay and transfer here.    On admission, thoracic surgery decided to perform transcervical mediastinal exploratory procedure, including EGD, bronchoscopy, and mediastinoscopy. Found the mass was not an abscess, rather is concerning for malignancy. Obtained cervical lymph node biopsy and tissue from the mass for pathology and cultures. ENT consulted, as well, and performed laryngoscopy, which left vocal fold paresis without edema of vocal folds.     Interval History   Patient feels better today. Throat pain is improved. Has some pain at surgical site from yesterday. Breathing is about the same as yesterday. She has been lying in bed since the surgery so hasn't noticed much shortness of breath. She is concerned about her allopurinol and potassium being held.    Discussed with patient the possibility of her mass being a malignancy. She took the information well. She is agreeable to remaining here in the hospital to await official diagnosis and to meet with oncology to learn about options and establish a plan for follow up care when she returns home to [place]. She does not want to start treatment here, would like to return home.    Physical Exam   Vital Signs: Temp: 98.3  F (36.8  C) Temp src: Oral BP: 144/66   Heart Rate: 82 Resp: 18 SpO2: 95 % O2 Device: None (Room air) Oxygen Delivery: 3 LPM  Weight: 442 lbs 12.8 oz  General: no acute distress,  speaks with hoarse, quiet voice  HEENT: normocephalic, atraumatic  Neck: neck with significant amount of soft tissue  Musculoskeletal: no obvious deformities  Skin: several linear, lighter-than-skin-color scars on bilateral forearms extensor surfaces; transverse anterior cervical incision about 8 cm in length, clean, dry, and intact  Neurologic: cranial nerves grossly intact, moves all extremities equally, no obvious focal deficits  Psychiatric: appropriate mood and affect    Data     Recent Labs  Lab 11/07/18  0605 11/06/18  0000 11/05/18  1351   WBC 21.1* 16.0* 16.0*   HGB 12.6 13.1 13.3   MCV 93 92 90    340 330    138 137   POTASSIUM 4.2 4.5 3.9   CHLORIDE 103 104 102   CO2 25 23 29   BUN 21 21 19   CR 0.71 0.86 0.85   ANIONGAP 10 11 6   DIOMEDES 8.4* 8.7 8.7   * 154* 84       Recent Results (from the past 24 hour(s))   CT Soft Tissue Neck w Contrast    Narrative    CT SOFT TISSUE NECK W CONTRAST 11/7/2018 12:39 PM    History:  mediastinal mass, likely thyroid in origin;       Comparison:  None available     Technique: Following intravenous administration of nonionic iodinated  contrast medium, thin section helical CT images were obtained from the  skull base down to the level of the aortic arch.  Axial, coronal and  sagittal reformations were performed with 2-3 mm slice thickness  reconstruction. Images were reviewed in soft tissue, lung and bone  windows.    Contrast: iopamidol (ISOVUE-370) solution 100 mL    Findings:   Recent postsurgical changes of mediastinoscopy with midline superior  thoracic subcutaneous soft tissue swelling, subcutaneous emphysema and  trace pneumomediastinum. Partially visualized approximately 3.9 x 4.0  x 5.0 cm ill-defined left paratracheal/paraesophageal soft tissue mass  results in rightward mediastinal deviation and infiltrates the  inferior margin of the left thyroid lobe. Please see dedicated chest  CT report from same day for further detail of thoracic  findings.    Scattered bilateral nonenlarged cervical lymph nodes. No cervical  lymphadenopathy. No destructive bone lesion or suspicious apical  pulmonary nodule.    No mucosal space mass. Major salivary glands are unremarkable. Limited  evaluation of the cervical arteries due to contrast bolus timing.  Carotid arteries are widely patent. Multilevel cervical spondylosis,  most severe on the right at C5-6. Paranasal sinuses and mastoid air  cells are clear. Orbits are unremarkable. Large region of chronic  infarction within the right PICA and superior cerebellar artery  territories.       Impression    IMPRESSION:  1. Left paratracheal mass, likely originating from the inferior aspect  of the left thyroid lobe, consistent with known neuroendocrine  carcinoma. No evidence of metastatic disease in the neck.  2. Expected recent post surgical changes of mediastinoscopy.    ADAM TRAVIS MD     Medications       zz extemporaneous template  500 mg Oral Daily     amLODIPine (NORVASC) tablet 5 mg  5 mg Oral Daily     aspirin tablet 325 mg  325 mg Oral Daily     cefTRIAXone  1 g Intravenous Q12H     [START ON 11/8/2018] cefTRIAXone  2 g Intravenous Once     cholecalciferol  2,000 Units Oral Daily     lidocaine 3%, phenylephrine 0.25% solution for irrigation  5 mL Irrigation Once     sodium chloride (PF)  3 mL Intracatheter Q8H

## 2018-11-07 NOTE — PROGRESS NOTES
Patient transferred to  from  by wheelchair with transport staff at 1230. Able to ambulate into room independently. Very pleasant, alert and oriented with hoarse voice. Reported some pain at PIV site and neck. Able to take medications with pudding. All belongings sent down and accounted for. Continue to monitor and follow plan of care.

## 2018-11-08 ENCOUNTER — APPOINTMENT (OUTPATIENT)
Dept: SPEECH THERAPY | Facility: CLINIC | Age: 48
DRG: 625 | End: 2018-11-08
Attending: INTERNAL MEDICINE
Payer: MEDICARE

## 2018-11-08 PROBLEM — J02.0 ACUTE STREPTOCOCCAL PHARYNGITIS: Status: ACTIVE | Noted: 2018-11-08

## 2018-11-08 PROBLEM — R49.0 HOARSENESS: Status: ACTIVE | Noted: 2018-11-08

## 2018-11-08 PROBLEM — R13.10 ODYNOPHAGIA: Status: ACTIVE | Noted: 2018-11-08

## 2018-11-08 LAB
ANION GAP SERPL CALCULATED.3IONS-SCNC: 8 MMOL/L (ref 3–14)
BASOPHILS # BLD AUTO: 0.1 10E9/L (ref 0–0.2)
BASOPHILS NFR BLD AUTO: 0.6 %
BUN SERPL-MCNC: 25 MG/DL (ref 7–30)
C DIFF TOX B STL QL: NEGATIVE
CALCIT SERPL-MCNC: <2 PG/ML (ref 0–5.1)
CALCIUM SERPL-MCNC: 8.2 MG/DL (ref 8.5–10.1)
CHLORIDE SERPL-SCNC: 106 MMOL/L (ref 94–109)
CO2 SERPL-SCNC: 26 MMOL/L (ref 20–32)
CREAT SERPL-MCNC: 0.84 MG/DL (ref 0.52–1.04)
CRP SERPL-MCNC: 43 MG/L (ref 0–8)
DIFFERENTIAL METHOD BLD: ABNORMAL
EOSINOPHIL # BLD AUTO: 0.2 10E9/L (ref 0–0.7)
EOSINOPHIL NFR BLD AUTO: 1 %
ERYTHROCYTE [DISTWIDTH] IN BLOOD BY AUTOMATED COUNT: 16.1 % (ref 10–15)
GFR SERPL CREATININE-BSD FRML MDRD: 72 ML/MIN/1.7M2
GLUCOSE SERPL-MCNC: 99 MG/DL (ref 70–99)
HCT VFR BLD AUTO: 40.3 % (ref 35–47)
HGB BLD-MCNC: 12.4 G/DL (ref 11.7–15.7)
IMM GRANULOCYTES # BLD: 0.6 10E9/L (ref 0–0.4)
IMM GRANULOCYTES NFR BLD: 3.4 %
LACTATE BLD-SCNC: 1.2 MMOL/L (ref 0.7–2)
LYMPHOCYTES # BLD AUTO: 3.4 10E9/L (ref 0.8–5.3)
LYMPHOCYTES NFR BLD AUTO: 20.8 %
MAGNESIUM SERPL-MCNC: 2 MG/DL (ref 1.6–2.3)
MCH RBC QN AUTO: 28.2 PG (ref 26.5–33)
MCHC RBC AUTO-ENTMCNC: 30.8 G/DL (ref 31.5–36.5)
MCV RBC AUTO: 92 FL (ref 78–100)
MONOCYTES # BLD AUTO: 1.5 10E9/L (ref 0–1.3)
MONOCYTES NFR BLD AUTO: 8.8 %
NEUTROPHILS # BLD AUTO: 10.8 10E9/L (ref 1.6–8.3)
NEUTROPHILS NFR BLD AUTO: 65.4 %
NRBC # BLD AUTO: 0 10*3/UL
NRBC BLD AUTO-RTO: 0 /100
PHOSPHATE SERPL-MCNC: 3.8 MG/DL (ref 2.5–4.5)
PLATELET # BLD AUTO: 331 10E9/L (ref 150–450)
POTASSIUM SERPL-SCNC: 3.8 MMOL/L (ref 3.4–5.3)
RBC # BLD AUTO: 4.4 10E12/L (ref 3.8–5.2)
SODIUM SERPL-SCNC: 139 MMOL/L (ref 133–144)
SPECIMEN SOURCE: NORMAL
T4 FREE SERPL-MCNC: 1.04 NG/DL (ref 0.76–1.46)
TSH SERPL DL<=0.005 MIU/L-ACNC: 2.79 MU/L (ref 0.4–4)
WBC # BLD AUTO: 16.5 10E9/L (ref 4–11)

## 2018-11-08 PROCEDURE — 25000128 H RX IP 250 OP 636: Performed by: FAMILY MEDICINE

## 2018-11-08 PROCEDURE — 25000132 ZZH RX MED GY IP 250 OP 250 PS 637: Mod: GY | Performed by: STUDENT IN AN ORGANIZED HEALTH CARE EDUCATION/TRAINING PROGRAM

## 2018-11-08 PROCEDURE — 83605 ASSAY OF LACTIC ACID: CPT | Performed by: FAMILY MEDICINE

## 2018-11-08 PROCEDURE — 84100 ASSAY OF PHOSPHORUS: CPT | Performed by: FAMILY MEDICINE

## 2018-11-08 PROCEDURE — A9270 NON-COVERED ITEM OR SERVICE: HCPCS | Mod: GY | Performed by: STUDENT IN AN ORGANIZED HEALTH CARE EDUCATION/TRAINING PROGRAM

## 2018-11-08 PROCEDURE — 36415 COLL VENOUS BLD VENIPUNCTURE: CPT | Performed by: FAMILY MEDICINE

## 2018-11-08 PROCEDURE — 40000225 ZZH STATISTIC SLP WARD VISIT

## 2018-11-08 PROCEDURE — 12000001 ZZH R&B MED SURG/OB UMMC

## 2018-11-08 PROCEDURE — 92526 ORAL FUNCTION THERAPY: CPT | Mod: GN

## 2018-11-08 PROCEDURE — 84443 ASSAY THYROID STIM HORMONE: CPT | Performed by: FAMILY MEDICINE

## 2018-11-08 PROCEDURE — 87493 C DIFF AMPLIFIED PROBE: CPT | Performed by: FAMILY MEDICINE

## 2018-11-08 PROCEDURE — 87493 C DIFF AMPLIFIED PROBE: CPT | Performed by: STUDENT IN AN ORGANIZED HEALTH CARE EDUCATION/TRAINING PROGRAM

## 2018-11-08 PROCEDURE — 84439 ASSAY OF FREE THYROXINE: CPT | Performed by: FAMILY MEDICINE

## 2018-11-08 PROCEDURE — 80048 BASIC METABOLIC PNL TOTAL CA: CPT | Performed by: FAMILY MEDICINE

## 2018-11-08 PROCEDURE — 85025 COMPLETE CBC W/AUTO DIFF WBC: CPT | Performed by: FAMILY MEDICINE

## 2018-11-08 PROCEDURE — 83735 ASSAY OF MAGNESIUM: CPT | Performed by: FAMILY MEDICINE

## 2018-11-08 PROCEDURE — 86140 C-REACTIVE PROTEIN: CPT | Performed by: FAMILY MEDICINE

## 2018-11-08 RX ORDER — ALLOPURINOL 100 MG/1
200 TABLET ORAL DAILY
COMMUNITY

## 2018-11-08 RX ORDER — AMLODIPINE BESYLATE 5 MG/1
5 TABLET ORAL DAILY
COMMUNITY

## 2018-11-08 RX ORDER — ASPIRIN 325 MG
325 TABLET ORAL DAILY
COMMUNITY

## 2018-11-08 RX ORDER — LACTOBACILLUS RHAMNOSUS GG 10B CELL
1 CAPSULE ORAL 2 TIMES DAILY
Status: DISCONTINUED | OUTPATIENT
Start: 2018-11-08 | End: 2018-11-09 | Stop reason: HOSPADM

## 2018-11-08 RX ORDER — POTASSIUM CITRATE 15 MEQ/1
1 TABLET, EXTENDED RELEASE ORAL 2 TIMES DAILY
COMMUNITY

## 2018-11-08 RX ORDER — NAPROXEN 500 MG/1
500 TABLET ORAL 2 TIMES DAILY PRN
COMMUNITY

## 2018-11-08 RX ORDER — ALLOPURINOL 300 MG/1
300 TABLET ORAL DAILY
COMMUNITY

## 2018-11-08 RX ADMIN — CEFTRIAXONE SODIUM 2 G: 2 INJECTION, POWDER, FOR SOLUTION INTRAMUSCULAR; INTRAVENOUS at 08:10

## 2018-11-08 RX ADMIN — Medication 1 CAPSULE: at 20:31

## 2018-11-08 RX ADMIN — AMLODIPINE BESYLATE 5 MG: 5 TABLET ORAL at 08:10

## 2018-11-08 RX ADMIN — ALLOPURINOL 500 MG: 100 TABLET ORAL at 08:09

## 2018-11-08 RX ADMIN — ASPIRIN 325 MG ORAL TABLET 325 MG: 325 PILL ORAL at 08:09

## 2018-11-08 RX ADMIN — Medication 1 CAPSULE: at 14:15

## 2018-11-08 RX ADMIN — VITAMIN D, TAB 1000IU (100/BT) 2000 UNITS: 25 TAB at 08:10

## 2018-11-08 ASSESSMENT — ENCOUNTER SYMPTOMS
CHILLS: 0
SORE THROAT: 1
WEAKNESS: 0
UNEXPECTED WEIGHT CHANGE: 0
ARTHRALGIAS: 0
WHEEZING: 0
HEADACHES: 0
SHORTNESS OF BREATH: 0
ABDOMINAL PAIN: 0
APPETITE CHANGE: 1
COUGH: 0
AGITATION: 0
VOMITING: 0
FEVER: 0
TROUBLE SWALLOWING: 1
DIFFICULTY URINATING: 0
STRIDOR: 0
CONSTIPATION: 0
DYSPHORIC MOOD: 0
NAUSEA: 0
DIARRHEA: 1
NUMBNESS: 0

## 2018-11-08 ASSESSMENT — ACTIVITIES OF DAILY LIVING (ADL)
ADLS_ACUITY_SCORE: 12
ADLS_ACUITY_SCORE: 12
ADLS_ACUITY_SCORE: 11
ADLS_ACUITY_SCORE: 12

## 2018-11-08 NOTE — PROGRESS NOTES
Care Coordinator Progress Note    Admission Date/Time:  11/5/2018  Attending MD:  Ankush Fuller MD    Data  Chart reviewed, discussed with interdisciplinary team.   Patient was admitted for:    Mediastinal mass  Acute streptococcal pharyngitis  Odynophagia  Hoarseness  FRANCESCA on CPAP.    Concerns with insurance coverage for discharge needs: None.  Current Living Situation: Patient lives alone.  Support System: Supportive  Services Involved: None  Transportation at Discharge: Car and Self  Transportation to Medical Appointments:   - Not applicable  Barriers to Discharge: Medical stabilty confirmation of speciality provider    Assessment  Per discussion with primary Nathalia MALDONADO provider team requested confirmation of primary care provider and speciality provider for post hospitalization management.       Met with pt.  Introduced RNCC role.  Patient is from [place] and plans to drive back to [place] as soon as she is cleared to discharge.  Patient confirmed her PCP is:    Elizabeth Willingham  [address] [address]  [phone]    Pt signed a relase form to have medical records sent to her PCP.  Per pt she has an appointment with her PCP scheduled for Friday 11/16.     Pt has a hematologist/oncologist;    Dr. Harpreet Small  [address] [facility] [address]  [facility] [address]  [phone]     Pt signed a release form to have medical records sent to he PCP.  Per pt she has an appointment with her oncologist on 11/15.     Defer to primary Nathalia MALDONADO for on going management.    Cheryl Albarran, RN BSN, PHN RN Care Coordinator  Medicine Teams: Gold 1; Gold 2; ED/Observation   [phone]  Pager: [phone]  11/8/2018 12:21 PM

## 2018-11-08 NOTE — PHARMACY-ADMISSION MEDICATION HISTORY
"Admission medication history interview status for the 11/5/2018 admission is complete. See Epic admission navigator for allergy information, pharmacy, prior to admission medications and immunization status.     Medication history interview sources:  patient    Changes made to PTA medication list (reason)  Added: none  Deleted: none  Changed: changed all entries from \"PO\" to the correct strength of tablet    Additional medication history information (including reliability of information, actions taken by pharmacist): Patient is a reliable historian. She was very comfortable with her medications and their dosages.      Prior to Admission medications    Medication Sig Last Dose Taking? Auth Provider   allopurinol (ZYLOPRIM) 100 MG tablet Take 200 mg by mouth daily Take with 300 mg tablet.  Yes Unknown, Entered By History   allopurinol (ZYLOPRIM) 300 MG tablet Take 300 mg by mouth daily Take with two 100 mg tablets.  Yes Unknown, Entered By History   amLODIPine (NORVASC) 5 MG tablet Take 5 mg by mouth daily  Yes Unknown, Entered By History   aspirin 325 MG tablet Take 325 mg by mouth daily  Yes Unknown, Entered By History   cholecalciferol 2000 units CAPS Take 1 capsule by mouth daily  Yes Unknown, Entered By History   naproxen (NAPROSYN) 500 MG tablet Take 500 mg by mouth 2 times daily as needed for moderate pain  Yes Unknown, Entered By History   potassium citrate 15 MEQ (1620 MG) TBCR Take 1 tablet by mouth 2 times daily  Yes Unknown, Entered By History         Medication history completed by: Marybel Watson, PharmD 4 Student      "

## 2018-11-08 NOTE — PLAN OF CARE
"Problem: Patient Care Overview  Goal: Plan of Care/Patient Progress Review  Outcome: No Change  /81 (BP Location: Left arm)  Pulse 101  Temp 97  F (36.1  C) (Oral)  Resp 20  Ht 1.803 m (5' 11\")  Wt (!) 200.9 kg (442 lb 12.8 oz)  SpO2 94%  BMI 61.76 kg/m2  Tachycardic. Full code.  A/ox4. Pt slept between cares. Anticipate  Future planning today. Up independently in room to shower. IV covered for shower and saline locked. Plan:  Hourly rounding complete, call light within reach.  Notify MD of changes.     "

## 2018-11-08 NOTE — PLAN OF CARE
Problem: Patient Care Overview  Goal: Plan of Care/Patient Progress Review  Outcome: No Change  Pt A & O. VSS. Up independently in room and hallway. No complaints of pain. Good appetite. Pt ate 100% of her meal. Pt had more than one tray of meal. IV antibiotic given. Continue with plan of care.

## 2018-11-08 NOTE — PLAN OF CARE
Problem: Patient Care Overview  Goal: Plan of Care/Patient Progress Review  Outcome: No Change  Pt A&O x 4.  AVSS on RA.  Up independently in room and halls.  C/o mild throat discomfort, denied need for intervention.  Pt c/o frequent loose stools.  Placed on enteric isolation, moved to private room, stool sample sent to lab.  Voice still hoarse. Pt has had some difficulty in swallowing thin liquids, although states it has improved a little bit.  Pt will plan to discharge with f/u in home [facility].  No further wants/needs.  Will continue to monitor, update physicians with any changes.

## 2018-11-08 NOTE — PROGRESS NOTES
"Otolaryngology Progress Note  November 8, 2018    S: No acute events overnight. Patient doing well saturating well and breathing comfortably on room air. Tolerating a diet per speech therapy recommendations with little dysphagia as long as she is adhering to swallowing guidelines. Patient eager to leave and return to [place] today      O: /85 (BP Location: Left arm)  Pulse 101  Temp 98.5  F (36.9  C) (Oral)  Resp 18  Ht 1.803 m (5' 11\")  Wt (!) 200.9 kg (442 lb 12.8 oz)  SpO2 95%  BMI 61.76 kg/m2   General: Alert and oriented x 3, No acute distress   HEENT: EOMI. HB 1/6. Oropharynx clear without erythema, swelling or exudates, uvula midline, trachea midline, biopsy incision clean, dry and intact no signs of infection     Pulmonary: Breathing non-labored, no stridor, no accessory muscle use.      Intake/Output Summary (Last 24 hours) at 11/08/18 1303  Last data filed at 11/07/18 1800   Gross per 24 hour   Intake              240 ml   Output                0 ml   Net              240 ml       LABS:  ROUTINE IP LABS (Last four results)  BMP  Recent Labs  Lab 11/08/18  0507 11/07/18  0605 11/06/18  0000 11/05/18  1351    137 138 137   POTASSIUM 3.8 4.2 4.5 3.9   CHLORIDE 106 103 104 102   DIOMEDES 8.2* 8.4* 8.7 8.7   CO2 26 25 23 29   BUN 25 21 21 19   CR 0.84 0.71 0.86 0.85   GLC 99 119* 154* 84     CBC  Recent Labs  Lab 11/08/18  0507 11/07/18  0605 11/06/18  0000 11/05/18  1351   WBC 16.5* 21.1* 16.0* 16.0*   RBC 4.40 4.43 4.61 4.72   HGB 12.4 12.6 13.1 13.3   HCT 40.3 41.1 42.5 42.5   MCV 92 93 92 90   MCH 28.2 28.4 28.4 28.2   MCHC 30.8* 30.7* 30.8* 31.3*   RDW 16.1* 15.9* 16.0* 15.9*    346 340 330     INRNo lab results found in last 7 days.    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     - Patient has expressed continued interest in receiving further care back home in [place]. An extensive discussion was had with the patient in collaboration with the oncology team today. The patient understands that she is the sole  of her care and it is her responsibility to establish care when she gets home. We have provided names of physicians at [facility] that she can contact. It was explained that her mass will require further work up and the extent of that work up will be dependent on physician preference and biopsy results. The final pathology results are still pending. Patient understood all information discussed. All of her questions and concerns were addressed at bedside. At the end of the discussion it was still her desire to return home to seek further care     - Patient provided with ENT physicians at [facility] for follow up   - Additional cares and decisions per primary team         -- Patient and above plan discussed with Dr. Brittani Murrell PGY1  Otolaryngology-Head & Neck Surgery  Please contact ENT with questions by dialing [phone] and entering job code [phone] when prompted.

## 2018-11-08 NOTE — OP NOTE
Procedure Date: 11/06/2018      PREOPERATIVE DIAGNOSES:     1.  Morbid obesity.   2.  Severe hoarseness.   3.  Possible esophageal perforation with upper mediastinal abscess.      POSTOPERATIVE DIAGNOSES:     1.  Morbid obesity.   2.  Left vocal cord paresis.   3.  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      PROCEDURES PERFORMED:   1.  Awake fiberoptic intubation (performed by Dr. Agustin, anesthesia).   2.  Flexible bronchoscopy.   3.  Esophagogastroduodenoscopy.   4.  Transcervical exploration of upper mediastinum.   5.  Left thyroid mass incisional biopsy.  For this part of the procedure, an intraoperative consult of Dr. Bg Peter from ENT was performed.  Please see separate dictation for details of his part of the operation.      ANESTHESIA:  General endotracheal anesthesia.  We used awake fiberoptic intubation given the patient's difficult airway.  We used a single lumen armoured endotracheal tube.      SURGEON:   Bismark Urena MD      INTRAOPERATIVE CONSULT:  Bg Peter MD from ENT.      ASSISTANT:  Diamante Carbajal MD, Cardiothoracic Surgery fellow.      ESTIMATED BLOOD LOSS:  Less than 10 mL.      COMPLICATIONS:  None.      SPECIMENS:   1.  Mediastinal fluid for Gram stain and culture.   2.  Upper mediastinal mass incisional biopsy for frozen.   3.  Left thyroid mass incisional biopsy for frozen.      OPERATIVE FINDINGS:  The patient had a hard mass originating from the thyroid isthmus as well as a left thyroid nodule.  We did not encounter any purulent material.  Incisional biopsy of the thyroid mass was consistent with neuroendocrine carcinoma, possible thyroid origin.  During awake fiberoptic intubation, we visualized the vocal cords.  The patient appeared to have mild paresis of the left vocal cord.  On flexible bronchoscopy, there were no endobronchial lesions or external compression.  On esophagogastroduodenoscopy there were no mucosal lesions or tears  in the upper esophagus or hypopharynx.      COMPLICATIONS:  None.      IMPLANTS:  None.      DISPOSITION:  To PACU for recovery.      DESCRIPTION OF PROCEDURE IN DETAIL:  The patient was taken to the operating room and placed in the supine position.  All pressure points were adequately padded.  Given the patient's difficult airway, an awake fiberoptic intubation was performed by Dr. Agustin.  After the patient was intubated, general anesthesia was induced.  The neck was extended.  We placed a shoulder roll and tucked both arms.  The patient's neck and chest were prepped with ChloraPrep and draped in a normal sterile fashion.  A formal timeout was carried out confirming the name of the correct procedure.  She had SCDs in place and functioning prior to induction of anesthesia.  She received antibiotics within 30 minutes of incision.      After the timeout was completed, we started by injecting local anesthesia using 0.25% Marcaine and 1% lidocaine, a 4 cm semicircular incision was placed at the base of the neck 1 fingerbreadth above the suprasternal notch.  The incision was taken through the subcutaneous tissue and the platysma.  Subplatysmal flaps were raised superiorly and inferiorly.  The strap muscles were then divided in the midline superiorly; however, as we worked inferiorly, we encountered that the strap muscles were densely adhered to what appeared to be the thyroid isthmus.  It appeared as if the patient had a mass originating from the thyroid isthmus extending into the left paratracheal space.  Next, the infraclavicular ligament was divided and bluntly a retrosternal plane was created with the surgeon's finger.  We were able to palpate the retrosternal space as well as the left paratracheal space.  We did not encounter any purulent material.  Given the patient's findings, not consistent with an infectious process, but more likely a malignancy, an intraoperative consult was obtained from ENT, Dr. Peter.   An incisional biopsy of the thyroid isthmus was sent to pathology for frozen.  At this point, Dr. Peter walked into the room and scrubbed in.  We agreed on minimizing further dissection and a second incisional biopsy was sent from the left thyroid mass.  This was sent to pathology for permanent.  The intraoperative frozen was consistent with a neuroendocrine carcinoma, favoring thyroid origin.  We decided to wait for final pathology to determine further therapy in this patient.  The wound was copiously irrigated with normal saline.  Hemostasis was confirmed.  The strap muscles were approximated in the midline using a single interrupted 3-0 Vicryl stitch. The platysma was reapproximated with interrupted 3-0 Vicryl.  The skin was closed with 4-0 Monocryl in a running subcuticular fashion.  Dermabond was applied to the skin.  The patient tolerated the procedure well.  She was awakened and extubated in the operating room without any complications.      All instrument and sponge counts were correct at the end of the case.      After we concluded this, a flexible bronchoscopy was performed.  We did not find any endobronchial lesions or external compression.  The patient had minimal thin secretions that were suctioned out.  Next, an esophagogastroduodenoscopy was performed.  An adult gastroscope was advanced into the hypopharynx and into the esophagus.  We did not encounter any mucosal lesions, evidence of tears, inflammatory process or blood in the lumen.  The rest of the examination of the esophageal mucosa, the stomach and the duodenum were normal.         BLANCA MOHAMUD MD             D: 2018   T: 2018   MT: SANTHOSH      Name:     CHUCK PADILLA   MRN:      [id number]        Account:        [id number]   :      1970           Procedure Date: 2018      Document: [id number]

## 2018-11-08 NOTE — DISCHARGE SUMMARY
[facility], [facility] Discharge Summary       Date of Admission:  11/5/2018  Date of Discharge:  11/9/2018  Date of Service: 11/9/2018  Discharging Attending Provider: Dr. Fuller  [facility] Team: [facility]    Discharge Diagnoses      Mediastinal mass  Acute streptococcal pharyngitis  Odynophagia  Hoarseness  FRANCESCA on CPAP    Follow-ups Needed After Discharge   Follow up with PCP for hospital follow up and to discuss coordination of care for thyroid mass.  Follow up with hematology to discuss coordination of care regarding thyroid mass.  Follow up with oncology in [place] as able to schedule for thyroid mass.    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 She was originally seen on 11/5/18 in [facility] ED with a 1 day history of sore throat, difficulty swallowing, and hoarse voice, which began about 3-4 hours after a meal, as well as subjective fever, chills, headache, cough with any liquid ingestion, neck pain, mild SOB, and epigastric chest pain. In the ED, vitals notable for tachycardia and labs significant for WBC 16.0 and rapid strep test positive. She was started on amoxicillin for strep pharyngitis. Chest XR showed a left superior mediastinal mass displacing and narrowing the trachea. Follow up chest CT showed large left mediastinal mass 5.0x3.0x2.7 cm. Antibiotics switched to vanc/zosyn with concern for esophageal rupture and abscess formation, and thoracic surgery recommended transfer to [facility] for further evaluation.      Thoracic surgery performed transcervical mediastinoscopy with EGD, bronchoscopy, and laryngoscopy. Found the mass was not an  abscess, rather is concerning for malignancy. Obtained cervical lymph node biopsy and tissue from the mass for pathology and cultures. ENT consulted, as well, and laryngoscopy showed left vocal fold paresis without edema of vocal folds. Biopsy preliminary result revealed neuroendocrine carcinoma, likely medullary thyroid carcinoma. CT neck revealed left paratracheal mass, likely originating from inferior aspect of left thyroid lobe, consistent with known neuroendocrine carcinoma. No evidence of metastatic disease in the neck. Final pathology report still pending.    # Large left mediastinal mass  # Shortness of breath   # Hoarseness  Biopsy by thoracic surgery suggestive of follicular vs medullary thyroid cancer; awaiting final pathology.  Likely cause of hoarseness with nerve compression/invasion. TSH and T4 normal, suggesting thyroid function is not compromised by the mass.  - Will try to provide path report and discharge summary to patient to facilitate smooth transition of care to her heme/onc doctor in [place] who does not seem to use our EMR ([employer]); will call and fax results as soon as available  - Medical records release signed here  - Outpatient SLP at discharge for ongoing management of vocal cord paresis and hoarse voice     # Strep pharyngitis  May be contributing to throat pain. Received 3 days of IV ceftriaxone, which should be appropriate treatment for acute Strep pharyngitis.  - Can try lozenges for throat pain relief      # Diarrhea  Patient reported watery diarrhea since she started eating on 11/7 mid-day. She has had about 5-6 episodes. Normally she has hard stools. Follows with a colorectal specialist in [place] who reportedly wants her to have harder stools. This could be due to antibiotics vs C diff as she is in the hospital and on antibiotic treatment.  - C. Diff negative.     # Leukocytosis  Patient has a chronic leukocytosis that is being worked up by hematology in [place]. WBC stable around 16,  likely not 2/2 acute infection as this is her baseline. May be related to underlying malignancy. Continue to monitor as outpatient.     Chronic medical problems:  # Hx of stroke  - Continue PTA aspirin     # FRANCESCA  - Continue CPAP at night     # HTN  - Continue PTA amlodipine     # Gout  - Continue PTA allopurinol     # Kidney stones  - Continue PTA KCL replacement (though in powder form here, not her usual pill)    # Discharge Pain Plan: - Patient currently has NO PAIN and is not being prescribed pain medications on discharge.    Consultations This Hospital Stay   PHARMACY TO DOSE VANCO  THORACIC SURGERY ADULT IP CONSULT  VASCULAR ACCESS CARE ADULT IP CONSULT  SOCIAL WORK IP CONSULT  SWALLOW EVAL SPEECH PATH AT BEDSIDE IP CONSULT  ENT IP CONSULT  OTOLARYNGOLOGY IP CONSULT  ONCOLOGY IP CONSULT  VASCULAR ACCESS CARE ADULT IP CONSULT  MEDICATION HISTORY IP PHARMACY CONSULT    Code Status   Full Code       The patient was discussed with Dr. Fuller.    [facility]  [facility]   Pager: [phone]  ______________________________________________________________________  Review of Systems   Constitutional: Positive for appetite change. Negative for chills, fever and unexpected weight change.   HENT: Positive for sore throat and trouble swallowing.    Eyes: Negative for visual disturbance.   Respiratory: Negative for cough, shortness of breath, wheezing and stridor.    Cardiovascular: Negative for chest pain and leg swelling.   Gastrointestinal: Positive for diarrhea. Negative for abdominal pain, constipation, nausea and vomiting.   Genitourinary: Negative for difficulty urinating.   Musculoskeletal: Negative for arthralgias.   Skin: Negative for pallor and rash.   Neurological: Negative for weakness, numbness and headaches.   Psychiatric/Behavioral: Negative for agitation and dysphoric mood.     Physical Exam   Vital Signs: Temp: 98.6  F (37  C) Temp src:  Oral BP: (!) 171/91 Pulse: 103 Heart Rate: 95 Resp: 18 SpO2: 98 % O2 Device: None (Room air)    Weight: 441 lbs 0 oz    Physical Exam  General: no acute distress, speaks with hoarse, quiet voice  HEENT: normocephalic, atraumatic  Respiratory: lung sounds clear to auscultation bilaterally, no wheezes, rhonchi, or crackles heard  Cardiac: regular rate and rhythm, S1/S2 heard, no murmurs, rubs, or gallops appreciated, heart sounds somewhat distant due to body habitus  Abdominal: non-distended, normal bowel sounds, soft, non-tender to palpation in all quadrants  Musculoskeletal: no obvious deformities  Skin: several linear, lighter-than-skin-color scars on bilateral forearms extensor surfaces; transverse anterior cervical incision about 8 cm in length, clean, dry, and intact  Neurologic: cranial nerves grossly intact, moves all extremities equally, no obvious focal deficits  Psychiatric: appropriate mood and affect    Significant Results and Procedures   Most Recent 3 CBC's:  Recent Labs   Lab Test  11/08/18   0507  11/07/18   0605  11/06/18   0000   WBC  16.5*  21.1*  16.0*   HGB  12.4  12.6  13.1   MCV  92  93  92   PLT  331  346  340     Most Recent 3 BMP's:  Recent Labs   Lab Test  11/08/18   0507  11/07/18   0605  11/06/18   0000   NA  139  137  138   POTASSIUM  3.8  4.2  4.5   CHLORIDE  106  103  104   CO2  26  25  23   BUN  25  21  21   CR  0.84  0.71  0.86   ANIONGAP  8  10  11   DIOMEDES  8.2*  8.4*  8.7   GLC  99  119*  154*     Most Recent TSH and T4:  Recent Labs   Lab Test  11/08/18   0507   TSH  2.79   T4  1.04   ,   Results for orders placed or performed during the hospital encounter of 11/05/18   CT Soft Tissue Neck w Contrast    Narrative    CT SOFT TISSUE NECK W CONTRAST 11/7/2018 12:39 PM    History:  mediastinal mass, likely thyroid in origin;       Comparison:  None available     Technique: Following intravenous administration of nonionic iodinated  contrast medium, thin section helical CT images were  obtained from the  skull base down to the level of the aortic arch.  Axial, coronal and  sagittal reformations were performed with 2-3 mm slice thickness  reconstruction. Images were reviewed in soft tissue, lung and bone  windows.    Contrast: iopamidol (ISOVUE-370) solution 100 mL    Findings:   Recent postsurgical changes of mediastinoscopy with midline superior  thoracic subcutaneous soft tissue swelling, subcutaneous emphysema and  trace pneumomediastinum. Partially visualized approximately 3.9 x 4.0  x 5.0 cm ill-defined left paratracheal/paraesophageal soft tissue mass  results in rightward mediastinal deviation and infiltrates the  inferior margin of the left thyroid lobe. Please see dedicated chest  CT report from same day for further detail of thoracic findings.    Scattered bilateral nonenlarged cervical lymph nodes. No cervical  lymphadenopathy. No destructive bone lesion or suspicious apical  pulmonary nodule.    No mucosal space mass. Major salivary glands are unremarkable. Limited  evaluation of the cervical arteries due to contrast bolus timing.  Carotid arteries are widely patent. Multilevel cervical spondylosis,  most severe on the right at C5-6. Paranasal sinuses and mastoid air  cells are clear. Orbits are unremarkable. Large region of chronic  infarction within the right PICA and superior cerebellar artery  territories.       Impression    IMPRESSION:  1. Left paratracheal mass, likely originating from the inferior aspect  of the left thyroid lobe, consistent with known neuroendocrine  carcinoma. No evidence of metastatic disease in the neck.  2. Expected recent post surgical changes of mediastinoscopy.    ADAM TRAVIS MD       Pending Results   These results will be followed up by PCP  Unresulted Labs Ordered in the Past 30 Days of this Admission     Date and Time Order Name Status Description    11/8/2018 0927 Clostridium difficile toxin B PCR In process     11/8/2018 0926 Clostridium  difficile toxin B PCR In process     11/6/2018 1300 Surgical pathology exam Preliminary     11/6/2018 1236 Fungus Culture, non-blood Preliminary     11/6/2018 1236 Fluid Culture Aerobic Bacterial Preliminary     11/6/2018 1236 Anaerobic bacterial culture Preliminary              Primary Care Physician   Elizabeth Willingham    Discharge Disposition   Discharged to home  Condition at discharge: Stable    Discharge Orders     Speech Therapy Referral       Discharge Medications   Current Discharge Medication List      CONTINUE these medications which have NOT CHANGED    Details   !! allopurinol (ZYLOPRIM) 100 MG tablet Take 200 mg by mouth daily Take with 300 mg tablet.      !! allopurinol (ZYLOPRIM) 300 MG tablet Take 300 mg by mouth daily Take with two 100 mg tablets.      amLODIPine (NORVASC) 5 MG tablet Take 5 mg by mouth daily      aspirin 325 MG tablet Take 325 mg by mouth daily      cholecalciferol 2000 units CAPS Take 1 capsule by mouth daily      naproxen (NAPROSYN) 500 MG tablet Take 500 mg by mouth 2 times daily as needed for moderate pain      potassium citrate 15 MEQ (1620 MG) TBCR Take 1 tablet by mouth 2 times daily       !! - Potential duplicate medications found. Please discuss with provider.        Allergies   Allergies   Allergen Reactions     Ace Inhibitors      Haldol [Haloperidol]      Serotonin Reuptake Inhibitors      Thorazine [Chlorpromazine]

## 2018-11-08 NOTE — PLAN OF CARE
"Problem: Patient Care Overview  Goal: Plan of Care/Patient Progress Review  Discharge Planner SLP   Patient plan for discharge: none stated  Current status: SLP: Upon ST entrance, pt sitting in room fully dressed threatening to leave AMA with c/o \"just sitting here.\" Pt was redirectable and agreeable to complete small amount of oropharyngeal strengthening exercises. Pt frustrated with inability to \"gulp\" liquids; educated pt on importance of safe swallow strategies to minimize aspiration risk. Recommend cautiously continue regular textures and thin liquids. Pt should be fully upright for all PO, take small single sips/bites, head turn to L each sip, and pace self.   Barriers to return to prior living situation: dysphagia; dysphonia   Recommendations for discharge: home with OP ST  Rationale for recommendations: pt would benefit from continued ST to train strategies to minimize aspiration risk        Entered by: Jada Parrish 11/08/2018 10:35 AM         "

## 2018-11-08 NOTE — PROGRESS NOTES
[facility] Progress Note - [facility]       Main Plans for Today   - Continue antibiotics for Strep (last day)  - Arrange for follow up with oncology and primary care in [place], need to send patient with all the information obtained here and have a way to communicate pathology and imaging results to her home providers    Assessment & Plan   Carol Hurst is a 47 year old female admitted on 11/5/2018. She has a PMHx of a stroke (2011), HTN, prediabetes, obesity, FRANCESCA, fatty liver, gout, nocturia, hematuria, kidney stones, and a herniated cervical disc, who was admitted for further evaluation of a large mediastinal mass noted on CT.     # Large left mediastinal mass  # Shortness of breath   # Hoarseness  Biopsy by thoracic surgery suggestive of follicular vs medullary thyroid cancer; awaiting final pathology.  Likely cause of hoarseness with nerve compression/invasion. TSH and T4 normal, suggesting thyroid function is not compromised by the mass.  - Thoracic surgery following, appreciate recs  - ENT following, appreciate recs  - Will try to provide path report and discharge summary to patient to facilitate smooth transition of care to her heme/onc doctor in [place] who does not seem to use our EMR ([employer])  - Outpatient SLP at discharge for ongoing management of vocal cord paresis and hoarse voice    # Strep pharyngitis  May be contributing to hoarse voice and some of throat pain, though also is likely she has pain due to mediastinal mass and compression of trachea, esophagus, and surrounding structures. Will treat for acute Strep infection.  - Continue ceftriaxone IV, switch from 1g q12 hrs to 2g q24 hrs, to finish 3 day course  - Can try lozenges for throat pain relief     # Diarrhea  Patient reported watery diarrhea since she started eating on 11/7 mid-day. She has had about 5-6 episodes. Normally she has hard stools. Follows with a colorectal specialist in  [place] who reportedly wants her to have harder stools. This could be due to antibiotics vs C diff as she is in the hospital and on antibiotic treatment.  - Check C diff  - Probiotic if C diff is negative    # Leukocytosis  - Patient has a chronic leukocytosis that is being worked up by hematology in [place]  - WBC stable around 16, likely not 2/2 acute infection as this is her baseline  - Will continue to monitor    Chronic medical problems:  # Hx of stroke  - Continue PTA aspirin     # FRANCESCA  - Continue CPAP at night    # HTN  - Continue PTA amlodipine    # Gout  - Continue PTA allopurinol    # Kidney stones  - Continue PTA KCL replacement (though in powder form here, not her usual pill)    # Pain Assessment:  Current Pain Score 11/8/2018   Patient currently in pain? sleeping: patient not able to self report   Pain score (0-10) -   Pain location -   Pain descriptors -   - Carol is experiencing pain due to strep pharyngitis and large mediastinal mass and post operative pain. Pain management was discussed and the plan was created in a collaborative fashion.  Carol's response to the current recommendations: engaged  - Please see the plan for pain management as documented above    Diet: High Kcal/High Protein Diet, ADULT  Room Service  Fluids: IVL, PO only  DVT Prophylaxis: Pneumatic Compression Devices for now, consider lovenox given malignancy  Code Status: Full Code    Disposition Plan   Expected discharge: 2 - 3 days, recommended to depends on management once adequate pain management/ tolerating PO medications and management plan established. Dispo: Expected Discharge Date: 11/09/18        Entered: Maryana Henry 11/08/2018, 9:38 AM   Information in the above section will display in the discharge planner report.      The patient's care was discussed with the Attending Physician, Dr. Fuller.    Maryana Henry, MS3    I was present with the medical student who participated in the service and in the  documentation of this note. I have verified the history and personally performed the physical exam and medical decision making, and have verified the content of the note, which accurately reflects my assessment of the patient and the plan of care.   Maryana Henry  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 She was originally seen at [facility] ED on 11/5/18 with a 1 day history of sore throat, difficulty swallowing, and hoarse voice, which began about 3-4 hours after a meal, as well as subj fever, chills, headache, cough with any liquid ingestion, neck pain, mild SOB, and epigastric chest pain. In [facility] ED, vitals notable for tachycardia and labs significant for WBC 16.0 and rapid strep test positive. She was started on amoxicillin for strep pharyngitis. Chest XR showed a left superior mediastinal mass displacing and narrowing the trachea. Follow up chest CT showed large left mediastinal mass 5.0x3.0x2.7 cm. Antibiotics switched to vanc/zosyn with concern for esophageal rupture and abscess formation, and thoracic surgery recommended transfer to [facility] for definitive care. Patient almost left AMA, but after several long discussions, she agreed to stay and transfer here.    On admission, thoracic surgery decided to perform transcervical mediastinal exploratory procedure, including EGD, bronchoscopy, and mediastinoscopy. Found the mass was not an abscess, rather is concerning for malignancy. Obtained cervical lymph node biopsy and tissue from the mass for pathology and cultures. ENT  consulted, as well, and performed laryngoscopy, which left vocal fold paresis without edema of vocal folds.     Interval History   Feeling okay today. Frustrated that she is not making much progress in hospital and just waiting around with nothing to do. Throat is more painful today than yesterday. Incision site is not bothersome. Having diarrhea since yesterday midday when she started eating again. Has had about 5-6 episodes of watery, non-bloody diarrhea. Usually she has hard stools. No abdominal pain.    Physical Exam   Vital Signs: Temp: 97  F (36.1  C) Temp src: Oral BP: 164/81 Pulse: 101 Heart Rate: 96 Resp: 20 SpO2: 94 % O2 Device: None (Room air)    Weight: 442 lbs 12.8 oz  General: no acute distress, speaks with hoarse, quiet voice  HEENT: normocephalic, atraumatic  Respiratory: lung sounds clear to auscultation bilaterally, no wheezes, rhonchi, or crackles heard  Cardiac: regular rate and rhythm, S1/S2 heard, no murmurs, rubs, or gallops appreciated, heart sounds somewhat distant due to body habitus  Abdominal: non-distended, normal bowel sounds, soft, non-tender to palpation in all quadrants  Musculoskeletal: no obvious deformities  Skin: several linear, lighter-than-skin-color scars on bilateral forearms extensor surfaces; transverse anterior cervical incision about 8 cm in length, clean, dry, and intact  Neurologic: cranial nerves grossly intact, moves all extremities equally, no obvious focal deficits  Psychiatric: appropriate mood and affect    Data     Recent Labs  Lab 11/08/18  0507 11/07/18  0605 11/06/18  0000   WBC 16.5* 21.1* 16.0*   HGB 12.4 12.6 13.1   MCV 92 93 92    346 340    137 138   POTASSIUM 3.8 4.2 4.5   CHLORIDE 106 103 104   CO2 26 25 23   BUN 25 21 21   CR 0.84 0.71 0.86   ANIONGAP 8 10 11   DIOMEDES 8.2* 8.4* 8.7   GLC 99 119* 154*       Recent Results (from the past 24 hour(s))   CT Soft Tissue Neck w Contrast    Narrative    CT SOFT TISSUE NECK W CONTRAST 11/7/2018 12:39  PM    History:  mediastinal mass, likely thyroid in origin;       Comparison:  None available     Technique: Following intravenous administration of nonionic iodinated  contrast medium, thin section helical CT images were obtained from the  skull base down to the level of the aortic arch.  Axial, coronal and  sagittal reformations were performed with 2-3 mm slice thickness  reconstruction. Images were reviewed in soft tissue, lung and bone  windows.    Contrast: iopamidol (ISOVUE-370) solution 100 mL    Findings:   Recent postsurgical changes of mediastinoscopy with midline superior  thoracic subcutaneous soft tissue swelling, subcutaneous emphysema and  trace pneumomediastinum. Partially visualized approximately 3.9 x 4.0  x 5.0 cm ill-defined left paratracheal/paraesophageal soft tissue mass  results in rightward mediastinal deviation and infiltrates the  inferior margin of the left thyroid lobe. Please see dedicated chest  CT report from same day for further detail of thoracic findings.    Scattered bilateral nonenlarged cervical lymph nodes. No cervical  lymphadenopathy. No destructive bone lesion or suspicious apical  pulmonary nodule.    No mucosal space mass. Major salivary glands are unremarkable. Limited  evaluation of the cervical arteries due to contrast bolus timing.  Carotid arteries are widely patent. Multilevel cervical spondylosis,  most severe on the right at C5-6. Paranasal sinuses and mastoid air  cells are clear. Orbits are unremarkable. Large region of chronic  infarction within the right PICA and superior cerebellar artery  territories.       Impression    IMPRESSION:  1. Left paratracheal mass, likely originating from the inferior aspect  of the left thyroid lobe, consistent with known neuroendocrine  carcinoma. No evidence of metastatic disease in the neck.  2. Expected recent post surgical changes of mediastinoscopy.    ADAM TRAVIS MD     Medications       anat extemporaneous template   500 mg Oral Daily     amLODIPine (NORVASC) tablet 5 mg  5 mg Oral Daily     aspirin tablet 325 mg  325 mg Oral Daily     cholecalciferol  2,000 Units Oral Daily     lactobacillus rhamnosus (GG)  1 capsule Oral BID     lidocaine 3%, phenylephrine 0.25% solution for irrigation  5 mL Irrigation Once     sodium chloride (PF)  3 mL Intracatheter Q8H       Resident/Fellow Attestation   I, Dileep Pineda, was present with the medical student who participated in the service and in the documentation of the note.  I have verified the history and personally performed the physical exam and medical decision making.  I agree with the assessment and plan of care as documented in the note.      Dileep Pineda MD   [facility]  PGY-1  Date of Service (when I saw the patient): 11/08/18

## 2018-11-09 ENCOUNTER — APPOINTMENT (OUTPATIENT)
Dept: SPEECH THERAPY | Facility: CLINIC | Age: 48
DRG: 625 | End: 2018-11-09
Attending: INTERNAL MEDICINE
Payer: MEDICARE

## 2018-11-09 VITALS
SYSTOLIC BLOOD PRESSURE: 168 MMHG | HEART RATE: 100 BPM | TEMPERATURE: 98.8 F | OXYGEN SATURATION: 94 % | RESPIRATION RATE: 18 BRPM | BODY MASS INDEX: 41.02 KG/M2 | DIASTOLIC BLOOD PRESSURE: 98 MMHG | WEIGHT: 293 LBS | HEIGHT: 71 IN

## 2018-11-09 LAB
ANION GAP SERPL CALCULATED.3IONS-SCNC: 8 MMOL/L (ref 3–14)
ANISOCYTOSIS BLD QL SMEAR: ABNORMAL
BACTERIA SPEC CULT: ABNORMAL
BASOPHILS # BLD AUTO: 0 10E9/L (ref 0–0.2)
BASOPHILS NFR BLD AUTO: 0 %
BUN SERPL-MCNC: 16 MG/DL (ref 7–30)
CALCIUM SERPL-MCNC: 8.4 MG/DL (ref 8.5–10.1)
CHLORIDE SERPL-SCNC: 103 MMOL/L (ref 94–109)
CO2 SERPL-SCNC: 29 MMOL/L (ref 20–32)
COPATH REPORT: NORMAL
CREAT SERPL-MCNC: 0.87 MG/DL (ref 0.52–1.04)
DIFFERENTIAL METHOD BLD: ABNORMAL
EOSINOPHIL # BLD AUTO: 0.1 10E9/L (ref 0–0.7)
EOSINOPHIL NFR BLD AUTO: 0.9 %
ERYTHROCYTE [DISTWIDTH] IN BLOOD BY AUTOMATED COUNT: 16 % (ref 10–15)
GFR SERPL CREATININE-BSD FRML MDRD: 70 ML/MIN/1.7M2
GLUCOSE SERPL-MCNC: 100 MG/DL (ref 70–99)
HCT VFR BLD AUTO: 42 % (ref 35–47)
HGB BLD-MCNC: 12.9 G/DL (ref 11.7–15.7)
LYMPHOCYTES # BLD AUTO: 3.2 10E9/L (ref 0.8–5.3)
LYMPHOCYTES NFR BLD AUTO: 19.6 %
Lab: ABNORMAL
MACROCYTES BLD QL SMEAR: PRESENT
MCH RBC QN AUTO: 28.4 PG (ref 26.5–33)
MCHC RBC AUTO-ENTMCNC: 30.7 G/DL (ref 31.5–36.5)
MCV RBC AUTO: 93 FL (ref 78–100)
MONOCYTES # BLD AUTO: 1.5 10E9/L (ref 0–1.3)
MONOCYTES NFR BLD AUTO: 8.9 %
MYELOCYTES # BLD: 0.1 10E9/L
MYELOCYTES NFR BLD MANUAL: 0.9 %
NEUTROPHILS # BLD AUTO: 11.4 10E9/L (ref 1.6–8.3)
NEUTROPHILS NFR BLD AUTO: 69.7 %
PLATELET # BLD AUTO: 382 10E9/L (ref 150–450)
POTASSIUM SERPL-SCNC: 3.9 MMOL/L (ref 3.4–5.3)
RBC # BLD AUTO: 4.54 10E12/L (ref 3.8–5.2)
SODIUM SERPL-SCNC: 140 MMOL/L (ref 133–144)
SPECIMEN SOURCE: ABNORMAL
WBC # BLD AUTO: 16.3 10E9/L (ref 4–11)

## 2018-11-09 PROCEDURE — 25000132 ZZH RX MED GY IP 250 OP 250 PS 637: Mod: GY | Performed by: STUDENT IN AN ORGANIZED HEALTH CARE EDUCATION/TRAINING PROGRAM

## 2018-11-09 PROCEDURE — 92526 ORAL FUNCTION THERAPY: CPT | Mod: GN

## 2018-11-09 PROCEDURE — 80048 BASIC METABOLIC PNL TOTAL CA: CPT | Performed by: STUDENT IN AN ORGANIZED HEALTH CARE EDUCATION/TRAINING PROGRAM

## 2018-11-09 PROCEDURE — A9270 NON-COVERED ITEM OR SERVICE: HCPCS | Mod: GY | Performed by: STUDENT IN AN ORGANIZED HEALTH CARE EDUCATION/TRAINING PROGRAM

## 2018-11-09 PROCEDURE — 40000225 ZZH STATISTIC SLP WARD VISIT

## 2018-11-09 PROCEDURE — 85025 COMPLETE CBC W/AUTO DIFF WBC: CPT | Performed by: STUDENT IN AN ORGANIZED HEALTH CARE EDUCATION/TRAINING PROGRAM

## 2018-11-09 PROCEDURE — 82565 ASSAY OF CREATININE: CPT | Performed by: STUDENT IN AN ORGANIZED HEALTH CARE EDUCATION/TRAINING PROGRAM

## 2018-11-09 PROCEDURE — 36415 COLL VENOUS BLD VENIPUNCTURE: CPT | Performed by: STUDENT IN AN ORGANIZED HEALTH CARE EDUCATION/TRAINING PROGRAM

## 2018-11-09 RX ORDER — LACTOBACILLUS ACIDOPHILUS 500MM CELL
1 CAPSULE ORAL
Qty: 60 CAPSULE | Refills: 0 | Status: SHIPPED | OUTPATIENT
Start: 2018-11-09

## 2018-11-09 RX ORDER — FLUTICASONE PROPIONATE 50 MCG
1-2 SPRAY, SUSPENSION (ML) NASAL DAILY
Qty: 1 BOTTLE | Refills: 11 | Status: SHIPPED | OUTPATIENT
Start: 2018-11-09

## 2018-11-09 RX ADMIN — ASPIRIN 325 MG ORAL TABLET 325 MG: 325 PILL ORAL at 09:09

## 2018-11-09 RX ADMIN — Medication 1 CAPSULE: at 09:09

## 2018-11-09 RX ADMIN — AMLODIPINE BESYLATE 5 MG: 5 TABLET ORAL at 09:09

## 2018-11-09 RX ADMIN — ALLOPURINOL 500 MG: 100 TABLET ORAL at 10:04

## 2018-11-09 RX ADMIN — VITAMIN D, TAB 1000IU (100/BT) 2000 UNITS: 25 TAB at 09:09

## 2018-11-09 ASSESSMENT — ACTIVITIES OF DAILY LIVING (ADL)
ADLS_ACUITY_SCORE: 11

## 2018-11-09 NOTE — PLAN OF CARE
Problem: Patient Care Overview  Goal: Plan of Care/Patient Progress Review  Discharge Planner SLP   Patient plan for discharge: return home today  Current status: SLP: Pt with overt s/sx of aspiration on thin liquids despite compensatory swallow strategies. Recommend continue regular textures and downgrade to nectar thick liquids. Pt should be fully upright for all PO, pace self, take small sips/bites, and use chin tuck strategy. Caregivers to encourage independent completion of oropharyngeal exercises.   Barriers to return to prior living situation: dysphagia; dysphonia  Recommendations for discharge: home with OP ST for dysphagia and voice tx  Rationale for recommendations: pt would benefit from continued ST to safely return to baseline diet level        Entered by: Jada Parrish 11/09/2018 10:54 AM

## 2018-11-09 NOTE — PLAN OF CARE
Problem: Patient Care Overview  Goal: Plan of Care/Patient Progress Review  Outcome: No Change  Pt A & O x4. VSS. Up independently in room and degroot. Episodes of loose stool. No complain of pain. Possible discharge tomorrow. Voice still hoarse. Difficulty with swallowing. Requested apple sauce to swallow medication. Continue with plan of care.

## 2018-11-09 NOTE — PLAN OF CARE
"Problem: Patient Care Overview  Goal: Plan of Care/Patient Progress Review  BP (!) 168/98 (BP Location: Left arm)  Pulse 100  Temp 98.8  F (37.1  C) (Oral)  Resp 18  Ht 1.803 m (5' 11\")  Wt (!) 200 kg (441 lb)  SpO2 94%  BMI 61.51 kg/m2 Alert/oriented x4.  Pt slept between cares.  RUE PIV patent and s/l.  Anticipate  dc soon. Up independently in room to shower. IV covered for shower and saline locked. Plan:  Hourly rounding complete, call light within reach.  Notify MD of changes.       "

## 2018-11-09 NOTE — PLAN OF CARE
Problem: Patient Care Overview  Goal: Plan of Care/Patient Progress Review  Outcome: Adequate for Discharge Date Met: 11/09/18  Patient is alert and oriented x4, The patient is vitally stable on room air. The patient denies pain and nausea. The patient is independent and ambulates in the room, calls appropriately. Discharge orders were received for the patient. I went over the AVS with the patient and all questions were answered at that time. The patient is aware that she has 3 RX to  at the discharge pharmacy. The patient will call a cab to take her to her car at [facility]. The patient walked off the unit around 12 PM .

## 2018-11-10 NOTE — PLAN OF CARE
Problem: Patient Care Overview  Goal: Plan of Care/Patient Progress Review  Speech Language Therapy Discharge Summary    Reason for therapy discharge:    Discharged to home with outpatient therapy.    Progress towards therapy goal(s). See goals on Care Plan in [facility] electronic health record for goal details.  Goals not met.  Barriers to achieving goals:   discharge from facility.    Therapy recommendation(s):    Continued therapy is recommended.  Rationale/Recommendations:  Pt with ongoing overt s/sx aspiraiton. Will benefit from voice and dysphagia tx as outpatient given function not at baseline levels. Pt with downgrade to nectar liquids 11/9, last recommended diet was regular solids and nectar liquids. .

## 2018-11-13 LAB
BACTERIA SPEC CULT: ABNORMAL
Lab: ABNORMAL
SPECIMEN SOURCE: ABNORMAL

## 2018-12-04 LAB
FUNGUS SPEC CULT: NORMAL
Lab: NORMAL
SPECIMEN SOURCE: NORMAL

## 2019-02-15 ENCOUNTER — HEALTH MAINTENANCE LETTER (OUTPATIENT)
Age: 49
End: 2019-02-15

## 2020-03-11 ENCOUNTER — HEALTH MAINTENANCE LETTER (OUTPATIENT)
Age: 50
End: 2020-03-11

## 2021-01-03 ENCOUNTER — HEALTH MAINTENANCE LETTER (OUTPATIENT)
Age: 51
End: 2021-01-03

## 2021-01-28 ENCOUNTER — HOSPITAL ENCOUNTER (OUTPATIENT)
Dept: LAB | Facility: MEDICAL CENTER | Age: 51
End: 2021-01-28
Payer: MEDICARE

## 2021-01-28 LAB
ALBUMIN SERPL BCP-MCNC: 4.3 G/DL (ref 3.2–4.9)
ALP SERPL-CCNC: 83 U/L (ref 30–99)
ALT SERPL-CCNC: 80 U/L (ref 2–50)
ANION GAP SERPL CALC-SCNC: 12 MMOL/L (ref 7–16)
AST SERPL-CCNC: 98 U/L (ref 12–45)
BASOPHILS # BLD AUTO: 0.7 % (ref 0–1.8)
BASOPHILS # BLD: 0.05 K/UL (ref 0–0.12)
BILIRUB CONJ SERPL-MCNC: <0.2 MG/DL (ref 0.1–0.5)
BILIRUB INDIRECT SERPL-MCNC: ABNORMAL MG/DL (ref 0–1)
BILIRUB SERPL-MCNC: 0.4 MG/DL (ref 0.1–1.5)
BUN SERPL-MCNC: 37 MG/DL (ref 8–22)
CALCIUM SERPL-MCNC: 9.8 MG/DL (ref 8.5–10.5)
CHLORIDE SERPL-SCNC: 92 MMOL/L (ref 96–112)
CHOLEST SERPL-MCNC: 216 MG/DL (ref 100–199)
CO2 SERPL-SCNC: 26 MMOL/L (ref 20–33)
CREAT SERPL-MCNC: 0.75 MG/DL (ref 0.5–1.4)
EOSINOPHIL # BLD AUTO: 0.23 K/UL (ref 0–0.51)
EOSINOPHIL NFR BLD: 3.1 % (ref 0–6.9)
ERYTHROCYTE [DISTWIDTH] IN BLOOD BY AUTOMATED COUNT: 47.6 FL (ref 35.9–50)
GLUCOSE SERPL-MCNC: 45 MG/DL (ref 65–99)
HCT VFR BLD AUTO: 40.3 % (ref 37–47)
HDLC SERPL-MCNC: 66 MG/DL
HGB BLD-MCNC: 13 G/DL (ref 12–16)
IMM GRANULOCYTES # BLD AUTO: 0.09 K/UL (ref 0–0.11)
IMM GRANULOCYTES NFR BLD AUTO: 1.2 % (ref 0–0.9)
LDLC SERPL CALC-MCNC: 130 MG/DL
LYMPHOCYTES # BLD AUTO: 0.58 K/UL (ref 1–4.8)
LYMPHOCYTES NFR BLD: 7.8 % (ref 22–41)
MCH RBC QN AUTO: 31.9 PG (ref 27–33)
MCHC RBC AUTO-ENTMCNC: 32.3 G/DL (ref 33.6–35)
MCV RBC AUTO: 98.8 FL (ref 81.4–97.8)
MONOCYTES # BLD AUTO: 0.49 K/UL (ref 0–0.85)
MONOCYTES NFR BLD AUTO: 6.6 % (ref 0–13.4)
NEUTROPHILS # BLD AUTO: 5.97 K/UL (ref 2–7.15)
NEUTROPHILS NFR BLD: 80.6 % (ref 44–72)
NRBC # BLD AUTO: 0 K/UL
NRBC BLD-RTO: 0 /100 WBC
PLATELET # BLD AUTO: 410 K/UL (ref 164–446)
PMV BLD AUTO: 9.8 FL (ref 9–12.9)
POTASSIUM SERPL-SCNC: 4.1 MMOL/L (ref 3.6–5.5)
PROT SERPL-MCNC: 8.5 G/DL (ref 6–8.2)
RBC # BLD AUTO: 4.08 M/UL (ref 4.2–5.4)
SODIUM SERPL-SCNC: 130 MMOL/L (ref 135–145)
TRIGL SERPL-MCNC: 99 MG/DL (ref 0–149)
WBC # BLD AUTO: 7.4 K/UL (ref 4.8–10.8)

## 2021-01-28 PROCEDURE — 84270 ASSAY OF SEX HORMONE GLOBUL: CPT

## 2021-01-28 PROCEDURE — 82671 ASSAY OF ESTROGENS: CPT

## 2021-01-28 PROCEDURE — 36415 COLL VENOUS BLD VENIPUNCTURE: CPT

## 2021-01-28 PROCEDURE — 80076 HEPATIC FUNCTION PANEL: CPT

## 2021-01-28 PROCEDURE — 85025 COMPLETE CBC W/AUTO DIFF WBC: CPT

## 2021-01-28 PROCEDURE — 84403 ASSAY OF TOTAL TESTOSTERONE: CPT

## 2021-01-28 PROCEDURE — 80061 LIPID PANEL: CPT

## 2021-01-28 PROCEDURE — 80048 BASIC METABOLIC PNL TOTAL CA: CPT

## 2021-01-28 PROCEDURE — 84402 ASSAY OF FREE TESTOSTERONE: CPT

## 2021-01-30 LAB
SHBG SERPL-SCNC: 72 NMOL/L (ref 30–135)
TESTOST FREE MFR SERPL: 1 %
TESTOST FREE SERPL-MCNC: 1 PG/ML
TESTOST SERPL-MCNC: 10 NG/DL

## 2021-02-12 LAB
ESTRADIOL SERPL HS-MCNC: 110 PG/ML
ESTROGEN SERPL CALC-MCNC: 191.6 PG/ML
ESTRONE SERPL-MCNC: 81.6 PG/ML

## 2021-04-25 ENCOUNTER — HEALTH MAINTENANCE LETTER (OUTPATIENT)
Age: 51
End: 2021-04-25

## 2021-10-10 ENCOUNTER — HEALTH MAINTENANCE LETTER (OUTPATIENT)
Age: 51
End: 2021-10-10

## 2022-04-24 ENCOUNTER — OFFICE VISIT (OUTPATIENT)
Dept: URGENT CARE | Facility: CLINIC | Age: 52
End: 2022-04-24
Payer: MEDICARE

## 2022-04-24 VITALS
HEIGHT: 72 IN | DIASTOLIC BLOOD PRESSURE: 64 MMHG | HEART RATE: 79 BPM | RESPIRATION RATE: 16 BRPM | SYSTOLIC BLOOD PRESSURE: 104 MMHG | OXYGEN SATURATION: 98 % | TEMPERATURE: 98.8 F

## 2022-04-24 DIAGNOSIS — H60.501 ACUTE OTITIS EXTERNA OF RIGHT EAR, UNSPECIFIED TYPE: ICD-10-CM

## 2022-04-24 DIAGNOSIS — H91.91: ICD-10-CM

## 2022-04-24 PROCEDURE — 99203 OFFICE O/P NEW LOW 30 MIN: CPT | Performed by: PHYSICIAN ASSISTANT

## 2022-04-24 RX ORDER — LEVOTHYROXINE SODIUM 0.2 MG/1
200 TABLET ORAL
COMMUNITY
Start: 2021-07-12 | End: 2022-07-12

## 2022-04-24 RX ORDER — ASPIRIN 325 MG
325 TABLET ORAL DAILY
COMMUNITY

## 2022-04-24 RX ORDER — LEVOTHYROXINE SODIUM 0.2 MG/1
TABLET ORAL
COMMUNITY
Start: 2022-02-22

## 2022-04-24 RX ORDER — AMLODIPINE BESYLATE 5 MG/1
5 TABLET ORAL DAILY
COMMUNITY

## 2022-04-24 RX ORDER — CAFFEINE 200 MG
400 TABLET ORAL DAILY
COMMUNITY

## 2022-04-24 RX ORDER — NEOMYCIN SULFATE, POLYMYXIN B SULFATE AND HYDROCORTISONE 10; 3.5; 1 MG/ML; MG/ML; [USP'U]/ML
4 SUSPENSION/ DROPS AURICULAR (OTIC) 3 TIMES DAILY
Qty: 10 ML | Refills: 0 | Status: SHIPPED | OUTPATIENT
Start: 2022-04-24

## 2022-04-24 RX ORDER — POTASSIUM CITRATE 15 MEQ/1
1 TABLET, EXTENDED RELEASE ORAL 2 TIMES DAILY
COMMUNITY

## 2022-04-24 RX ORDER — ALLOPURINOL 300 MG/1
300 TABLET ORAL DAILY
COMMUNITY

## 2022-04-24 RX ORDER — NAPROXEN 500 MG/1
500 TABLET ORAL PRN
COMMUNITY

## 2022-04-24 RX ORDER — ALLOPURINOL 100 MG/1
200 TABLET ORAL DAILY
COMMUNITY

## 2022-04-24 ASSESSMENT — ENCOUNTER SYMPTOMS
CHILLS: 0
SENSORY CHANGE: 0
SORE THROAT: 0
FOCAL WEAKNESS: 0
FEVER: 0
DIZZINESS: 0
DOUBLE VISION: 0
WEAKNESS: 0
TINGLING: 0
SHORTNESS OF BREATH: 0
NAUSEA: 0
HEADACHES: 0
BLURRED VISION: 0
COUGH: 0
VOMITING: 0

## 2022-05-21 ENCOUNTER — HEALTH MAINTENANCE LETTER (OUTPATIENT)
Age: 52
End: 2022-05-21

## 2022-09-18 ENCOUNTER — HEALTH MAINTENANCE LETTER (OUTPATIENT)
Age: 52
End: 2022-09-18

## 2023-06-04 ENCOUNTER — HEALTH MAINTENANCE LETTER (OUTPATIENT)
Age: 53
End: 2023-06-04

## 2023-11-01 ENCOUNTER — HOSPITAL ENCOUNTER (OUTPATIENT)
Dept: LAB | Facility: MEDICAL CENTER | Age: 53
End: 2023-11-01
Attending: INTERNAL MEDICINE
Payer: MEDICARE

## 2023-11-01 LAB
APPEARANCE UR: CLEAR
BILIRUB UR QL STRIP.AUTO: NEGATIVE
CHOLEST SERPL-MCNC: 155 MG/DL (ref 100–199)
COLOR UR: YELLOW
ESTRADIOL SERPL-MCNC: 136 PG/ML
FASTING STATUS PATIENT QL REPORTED: NORMAL
FERRITIN SERPL-MCNC: 199 NG/ML (ref 10–291)
GLUCOSE UR STRIP.AUTO-MCNC: NEGATIVE MG/DL
HDLC SERPL-MCNC: 45 MG/DL
HIV 1+2 AB+HIV1 P24 AG SERPL QL IA: NORMAL
IRON SATN MFR SERPL: 20 % (ref 15–55)
IRON SERPL-MCNC: 59 UG/DL (ref 40–170)
KETONES UR STRIP.AUTO-MCNC: NEGATIVE MG/DL
LDLC SERPL CALC-MCNC: 99 MG/DL
LEUKOCYTE ESTERASE UR QL STRIP.AUTO: NEGATIVE
MICRO URNS: NORMAL
NITRITE UR QL STRIP.AUTO: NEGATIVE
PH UR STRIP.AUTO: 7.5 [PH] (ref 5–8)
PROT UR QL STRIP: NEGATIVE MG/DL
RBC UR QL AUTO: NEGATIVE
SP GR UR STRIP.AUTO: 1.01
TIBC SERPL-MCNC: 296 UG/DL (ref 250–450)
TRIGL SERPL-MCNC: 55 MG/DL (ref 0–149)
UIBC SERPL-MCNC: 237 UG/DL (ref 110–370)
UROBILINOGEN UR STRIP.AUTO-MCNC: 0.2 MG/DL

## 2023-11-01 PROCEDURE — 36415 COLL VENOUS BLD VENIPUNCTURE: CPT

## 2023-11-01 PROCEDURE — 80061 LIPID PANEL: CPT

## 2023-11-01 PROCEDURE — 83540 ASSAY OF IRON: CPT

## 2023-11-01 PROCEDURE — 83550 IRON BINDING TEST: CPT

## 2023-11-01 PROCEDURE — 82728 ASSAY OF FERRITIN: CPT

## 2023-11-01 PROCEDURE — 84153 ASSAY OF PSA TOTAL: CPT | Mod: GA

## 2023-11-01 PROCEDURE — 84270 ASSAY OF SEX HORMONE GLOBUL: CPT

## 2023-11-01 PROCEDURE — G0475 HIV COMBINATION ASSAY: HCPCS | Mod: GA

## 2023-11-01 PROCEDURE — 81003 URINALYSIS AUTO W/O SCOPE: CPT

## 2023-11-01 PROCEDURE — 84402 ASSAY OF FREE TESTOSTERONE: CPT

## 2023-11-01 PROCEDURE — 82670 ASSAY OF TOTAL ESTRADIOL: CPT

## 2023-11-01 PROCEDURE — 84403 ASSAY OF TOTAL TESTOSTERONE: CPT

## 2023-11-03 LAB — PSA SERPL DL<=0.01 NG/ML-MCNC: 0.04 NG/ML (ref 0–4)

## 2023-11-05 LAB
SHBG SERPL-SCNC: 86 NMOL/L (ref 17–125)
TESTOST FREE SERPL-MCNC: 0.7 PG/ML (ref 0.6–3.8)
TESTOST SERPL-MCNC: 8 NG/DL (ref 9–55)

## 2024-04-22 ENCOUNTER — HOSPITAL ENCOUNTER (OUTPATIENT)
Dept: LAB | Facility: MEDICAL CENTER | Age: 54
End: 2024-04-22
Attending: INTERNAL MEDICINE
Payer: MEDICARE

## 2024-04-22 LAB — ESTRADIOL SERPL-MCNC: 85 PG/ML

## 2024-04-22 PROCEDURE — 84402 ASSAY OF FREE TESTOSTERONE: CPT

## 2024-04-22 PROCEDURE — 36415 COLL VENOUS BLD VENIPUNCTURE: CPT

## 2024-04-22 PROCEDURE — 84403 ASSAY OF TOTAL TESTOSTERONE: CPT

## 2024-04-22 PROCEDURE — 82670 ASSAY OF TOTAL ESTRADIOL: CPT

## 2024-04-22 PROCEDURE — 84270 ASSAY OF SEX HORMONE GLOBUL: CPT

## 2024-04-27 LAB
SHBG SERPL-SCNC: 93 NMOL/L (ref 17–125)
TESTOST FREE SERPL-MCNC: 2 PG/ML (ref 0.6–3.8)
TESTOST SERPL-MCNC: 24 NG/DL (ref 9–55)

## 2024-08-23 ENCOUNTER — APPOINTMENT (OUTPATIENT)
Dept: RADIOLOGY | Facility: MEDICAL CENTER | Age: 54
End: 2024-08-23
Attending: INTERNAL MEDICINE
Payer: MEDICARE

## 2024-08-23 ENCOUNTER — HOSPITAL ENCOUNTER (OUTPATIENT)
Dept: LAB | Facility: MEDICAL CENTER | Age: 54
End: 2024-08-23
Attending: INTERNAL MEDICINE
Payer: MEDICARE

## 2024-08-23 DIAGNOSIS — J90 PLEURAL EFFUSION: ICD-10-CM

## 2024-08-23 LAB
ALBUMIN SERPL BCP-MCNC: 3.8 G/DL (ref 3.2–4.9)
ALBUMIN/GLOB SERPL: 1 G/DL
ALP SERPL-CCNC: 74 U/L (ref 30–99)
ALT SERPL-CCNC: 26 U/L (ref 2–50)
ANION GAP SERPL CALC-SCNC: 10 MMOL/L (ref 7–16)
AST SERPL-CCNC: 40 U/L (ref 12–45)
BILIRUB SERPL-MCNC: 0.3 MG/DL (ref 0.1–1.5)
BUN SERPL-MCNC: 26 MG/DL (ref 8–22)
CALCIUM ALBUM COR SERPL-MCNC: 9.5 MG/DL (ref 8.5–10.5)
CALCIUM SERPL-MCNC: 9.3 MG/DL (ref 8.5–10.5)
CHLORIDE SERPL-SCNC: 93 MMOL/L (ref 96–112)
CO2 SERPL-SCNC: 26 MMOL/L (ref 20–33)
CREAT SERPL-MCNC: 0.65 MG/DL (ref 0.5–1.4)
GFR SERPLBLD CREATININE-BSD FMLA CKD-EPI: 104 ML/MIN/1.73 M 2
GLOBULIN SER CALC-MCNC: 3.9 G/DL (ref 1.9–3.5)
GLUCOSE SERPL-MCNC: 92 MG/DL (ref 65–99)
POTASSIUM SERPL-SCNC: 4.2 MMOL/L (ref 3.6–5.5)
PROT SERPL-MCNC: 7.7 G/DL (ref 6–8.2)
SODIUM SERPL-SCNC: 129 MMOL/L (ref 135–145)

## 2024-08-23 PROCEDURE — 36415 COLL VENOUS BLD VENIPUNCTURE: CPT

## 2024-08-23 PROCEDURE — 80053 COMPREHEN METABOLIC PANEL: CPT

## 2024-08-23 PROCEDURE — 71046 X-RAY EXAM CHEST 2 VIEWS: CPT

## 2024-09-20 ENCOUNTER — HOSPITAL ENCOUNTER (OUTPATIENT)
Dept: LAB | Facility: MEDICAL CENTER | Age: 54
End: 2024-09-20
Payer: MEDICARE

## 2024-09-20 LAB
ALBUMIN SERPL BCP-MCNC: 3.5 G/DL (ref 3.2–4.9)
ALBUMIN/GLOB SERPL: 0.8 G/DL
ALP SERPL-CCNC: 103 U/L (ref 30–99)
ALT SERPL-CCNC: 28 U/L (ref 2–50)
ANION GAP SERPL CALC-SCNC: 8 MMOL/L (ref 7–16)
AST SERPL-CCNC: 34 U/L (ref 12–45)
BASOPHILS # BLD AUTO: 1.2 % (ref 0–1.8)
BASOPHILS # BLD: 0.07 K/UL (ref 0–0.12)
BILIRUB SERPL-MCNC: 0.4 MG/DL (ref 0.1–1.5)
BUN SERPL-MCNC: 35 MG/DL (ref 8–22)
CALCIUM ALBUM COR SERPL-MCNC: 9.3 MG/DL (ref 8.5–10.5)
CALCIUM SERPL-MCNC: 8.9 MG/DL (ref 8.4–10.2)
CHLORIDE SERPL-SCNC: 96 MMOL/L (ref 96–112)
CO2 SERPL-SCNC: 25 MMOL/L (ref 20–33)
CREAT SERPL-MCNC: 0.68 MG/DL (ref 0.5–1.4)
EOSINOPHIL # BLD AUTO: 0.47 K/UL (ref 0–0.51)
EOSINOPHIL NFR BLD: 7.8 % (ref 0–6.9)
ERYTHROCYTE [DISTWIDTH] IN BLOOD BY AUTOMATED COUNT: 41.9 FL (ref 35.9–50)
GFR SERPLBLD CREATININE-BSD FMLA CKD-EPI: 103 ML/MIN/1.73 M 2
GLOBULIN SER CALC-MCNC: 4.3 G/DL (ref 1.9–3.5)
GLUCOSE SERPL-MCNC: 69 MG/DL (ref 65–99)
HCT VFR BLD AUTO: 34.8 % (ref 37–47)
HGB BLD-MCNC: 11.8 G/DL (ref 12–16)
IMM GRANULOCYTES # BLD AUTO: 0.02 K/UL (ref 0–0.11)
IMM GRANULOCYTES NFR BLD AUTO: 0.3 % (ref 0–0.9)
LYMPHOCYTES # BLD AUTO: 0.62 K/UL (ref 1–4.8)
LYMPHOCYTES NFR BLD: 10.2 % (ref 22–41)
MCH RBC QN AUTO: 30 PG (ref 27–33)
MCHC RBC AUTO-ENTMCNC: 33.9 G/DL (ref 32.2–35.5)
MCV RBC AUTO: 88.5 FL (ref 81.4–97.8)
MONOCYTES # BLD AUTO: 0.45 K/UL (ref 0–0.85)
MONOCYTES NFR BLD AUTO: 7.4 % (ref 0–13.4)
NEUTROPHILS # BLD AUTO: 4.42 K/UL (ref 1.82–7.42)
NEUTROPHILS NFR BLD: 73.1 % (ref 44–72)
NRBC # BLD AUTO: 0 K/UL
NRBC BLD-RTO: 0 /100 WBC (ref 0–0.2)
PLATELET # BLD AUTO: 454 K/UL (ref 164–446)
PMV BLD AUTO: 7.8 FL (ref 9–12.9)
POTASSIUM SERPL-SCNC: 4.5 MMOL/L (ref 3.6–5.5)
PROT SERPL-MCNC: 7.8 G/DL (ref 6–8.2)
RBC # BLD AUTO: 3.93 M/UL (ref 4.2–5.4)
SODIUM SERPL-SCNC: 129 MMOL/L (ref 135–145)
WBC # BLD AUTO: 6.1 K/UL (ref 4.8–10.8)

## 2024-09-20 PROCEDURE — 36415 COLL VENOUS BLD VENIPUNCTURE: CPT

## 2024-09-20 PROCEDURE — 85025 COMPLETE CBC W/AUTO DIFF WBC: CPT

## 2024-09-20 PROCEDURE — 80053 COMPREHEN METABOLIC PANEL: CPT

## 2024-09-23 ENCOUNTER — HOSPITAL ENCOUNTER (OUTPATIENT)
Dept: LAB | Facility: MEDICAL CENTER | Age: 54
End: 2024-09-23
Attending: FAMILY MEDICINE
Payer: MEDICARE

## 2024-09-23 LAB
BASOPHILS # BLD AUTO: 1.8 % (ref 0–1.8)
BASOPHILS # BLD: 0.09 K/UL (ref 0–0.12)
EOSINOPHIL # BLD AUTO: 0.48 K/UL (ref 0–0.51)
EOSINOPHIL NFR BLD: 9.6 % (ref 0–6.9)
ERYTHROCYTE [DISTWIDTH] IN BLOOD BY AUTOMATED COUNT: 42 FL (ref 35.9–50)
HCT VFR BLD AUTO: 33.3 % (ref 37–47)
HGB BLD-MCNC: 11.3 G/DL (ref 12–16)
IMM GRANULOCYTES # BLD AUTO: 0.03 K/UL (ref 0–0.11)
IMM GRANULOCYTES NFR BLD AUTO: 0.6 % (ref 0–0.9)
LYMPHOCYTES # BLD AUTO: 0.66 K/UL (ref 1–4.8)
LYMPHOCYTES NFR BLD: 13.2 % (ref 22–41)
MCH RBC QN AUTO: 29.9 PG (ref 27–33)
MCHC RBC AUTO-ENTMCNC: 33.9 G/DL (ref 32.2–35.5)
MCV RBC AUTO: 88.1 FL (ref 81.4–97.8)
MONOCYTES # BLD AUTO: 0.48 K/UL (ref 0–0.85)
MONOCYTES NFR BLD AUTO: 9.6 % (ref 0–13.4)
NEUTROPHILS # BLD AUTO: 3.25 K/UL (ref 1.82–7.42)
NEUTROPHILS NFR BLD: 65.2 % (ref 44–72)
NRBC # BLD AUTO: 0 K/UL
NRBC BLD-RTO: 0 /100 WBC (ref 0–0.2)
PLATELET # BLD AUTO: 545 K/UL (ref 164–446)
PMV BLD AUTO: 8.6 FL (ref 9–12.9)
RBC # BLD AUTO: 3.78 M/UL (ref 4.2–5.4)
WBC # BLD AUTO: 5 K/UL (ref 4.8–10.8)

## 2024-09-23 PROCEDURE — 80053 COMPREHEN METABOLIC PANEL: CPT

## 2024-09-23 PROCEDURE — 85025 COMPLETE CBC W/AUTO DIFF WBC: CPT

## 2024-09-23 PROCEDURE — 36415 COLL VENOUS BLD VENIPUNCTURE: CPT

## 2024-09-24 LAB
ALBUMIN SERPL BCP-MCNC: 3.4 G/DL (ref 3.2–4.9)
ALBUMIN/GLOB SERPL: 0.8 G/DL
ALP SERPL-CCNC: 101 U/L (ref 30–99)
ALT SERPL-CCNC: 37 U/L (ref 2–50)
ANION GAP SERPL CALC-SCNC: 10 MMOL/L (ref 7–16)
AST SERPL-CCNC: 57 U/L (ref 12–45)
BILIRUB SERPL-MCNC: 0.2 MG/DL (ref 0.1–1.5)
BUN SERPL-MCNC: 30 MG/DL (ref 8–22)
CALCIUM ALBUM COR SERPL-MCNC: 9.3 MG/DL (ref 8.5–10.5)
CALCIUM SERPL-MCNC: 8.8 MG/DL (ref 8.5–10.5)
CHLORIDE SERPL-SCNC: 93 MMOL/L (ref 96–112)
CO2 SERPL-SCNC: 24 MMOL/L (ref 20–33)
CREAT SERPL-MCNC: 0.62 MG/DL (ref 0.5–1.4)
GFR SERPLBLD CREATININE-BSD FMLA CKD-EPI: 105 ML/MIN/1.73 M 2
GLOBULIN SER CALC-MCNC: 4.3 G/DL (ref 1.9–3.5)
GLUCOSE SERPL-MCNC: 82 MG/DL (ref 65–99)
POTASSIUM SERPL-SCNC: 4.5 MMOL/L (ref 3.6–5.5)
PROT SERPL-MCNC: 7.7 G/DL (ref 6–8.2)
SODIUM SERPL-SCNC: 127 MMOL/L (ref 135–145)

## 2024-09-30 ENCOUNTER — HOSPITAL ENCOUNTER (OUTPATIENT)
Dept: LAB | Facility: MEDICAL CENTER | Age: 54
End: 2024-09-30
Attending: INTERNAL MEDICINE
Payer: MEDICARE

## 2024-09-30 LAB
ALBUMIN SERPL BCP-MCNC: 3.5 G/DL (ref 3.2–4.9)
ALBUMIN/GLOB SERPL: 0.9 G/DL
ALP SERPL-CCNC: 101 U/L (ref 30–99)
ALT SERPL-CCNC: 44 U/L (ref 2–50)
ANION GAP SERPL CALC-SCNC: 8 MMOL/L (ref 7–16)
AST SERPL-CCNC: 60 U/L (ref 12–45)
BASOPHILS # BLD AUTO: 1.3 % (ref 0–1.8)
BASOPHILS # BLD: 0.06 K/UL (ref 0–0.12)
BILIRUB SERPL-MCNC: 0.3 MG/DL (ref 0.1–1.5)
BUN SERPL-MCNC: 28 MG/DL (ref 8–22)
CALCIUM ALBUM COR SERPL-MCNC: 9 MG/DL (ref 8.5–10.5)
CALCIUM SERPL-MCNC: 8.6 MG/DL (ref 8.5–10.5)
CHLORIDE SERPL-SCNC: 95 MMOL/L (ref 96–112)
CO2 SERPL-SCNC: 27 MMOL/L (ref 20–33)
CREAT SERPL-MCNC: 0.68 MG/DL (ref 0.5–1.4)
EOSINOPHIL # BLD AUTO: 0.33 K/UL (ref 0–0.51)
EOSINOPHIL NFR BLD: 7 % (ref 0–6.9)
ERYTHROCYTE [DISTWIDTH] IN BLOOD BY AUTOMATED COUNT: 44.5 FL (ref 35.9–50)
GFR SERPLBLD CREATININE-BSD FMLA CKD-EPI: 103 ML/MIN/1.73 M 2
GLOBULIN SER CALC-MCNC: 4.1 G/DL (ref 1.9–3.5)
GLUCOSE SERPL-MCNC: 86 MG/DL (ref 65–99)
HCT VFR BLD AUTO: 35.6 % (ref 37–47)
HGB BLD-MCNC: 11.6 G/DL (ref 12–16)
IMM GRANULOCYTES # BLD AUTO: 0.03 K/UL (ref 0–0.11)
IMM GRANULOCYTES NFR BLD AUTO: 0.6 % (ref 0–0.9)
LYMPHOCYTES # BLD AUTO: 0.56 K/UL (ref 1–4.8)
LYMPHOCYTES NFR BLD: 11.8 % (ref 22–41)
MCH RBC QN AUTO: 29.4 PG (ref 27–33)
MCHC RBC AUTO-ENTMCNC: 32.6 G/DL (ref 32.2–35.5)
MCV RBC AUTO: 90.4 FL (ref 81.4–97.8)
MONOCYTES # BLD AUTO: 0.42 K/UL (ref 0–0.85)
MONOCYTES NFR BLD AUTO: 8.9 % (ref 0–13.4)
NEUTROPHILS # BLD AUTO: 3.34 K/UL (ref 1.82–7.42)
NEUTROPHILS NFR BLD: 70.4 % (ref 44–72)
NRBC # BLD AUTO: 0 K/UL
NRBC BLD-RTO: 0 /100 WBC (ref 0–0.2)
PLATELET # BLD AUTO: 462 K/UL (ref 164–446)
PMV BLD AUTO: 8.9 FL (ref 9–12.9)
POTASSIUM SERPL-SCNC: 4.2 MMOL/L (ref 3.6–5.5)
PROT SERPL-MCNC: 7.6 G/DL (ref 6–8.2)
RBC # BLD AUTO: 3.94 M/UL (ref 4.2–5.4)
SODIUM SERPL-SCNC: 130 MMOL/L (ref 135–145)
T4 FREE SERPL-MCNC: 0.64 NG/DL (ref 0.93–1.7)
TSH SERPL-ACNC: 0.01 UIU/ML (ref 0.35–5.5)
WBC # BLD AUTO: 4.7 K/UL (ref 4.8–10.8)

## 2024-09-30 PROCEDURE — 84443 ASSAY THYROID STIM HORMONE: CPT

## 2024-09-30 PROCEDURE — 84439 ASSAY OF FREE THYROXINE: CPT

## 2024-09-30 PROCEDURE — 86800 THYROGLOBULIN ANTIBODY: CPT

## 2024-09-30 PROCEDURE — 85025 COMPLETE CBC W/AUTO DIFF WBC: CPT

## 2024-09-30 PROCEDURE — 36415 COLL VENOUS BLD VENIPUNCTURE: CPT

## 2024-09-30 PROCEDURE — 80053 COMPREHEN METABOLIC PANEL: CPT

## 2024-10-02 LAB — THYROGLOB AB SERPL-ACNC: <0.9 IU/ML (ref 0–4)

## 2024-10-05 ENCOUNTER — HOSPITAL ENCOUNTER (OUTPATIENT)
Dept: LAB | Facility: MEDICAL CENTER | Age: 54
End: 2024-10-05
Payer: MEDICARE

## 2024-10-05 LAB
ALBUMIN SERPL BCP-MCNC: 3.6 G/DL (ref 3.2–4.9)
ALBUMIN/GLOB SERPL: 0.9 G/DL
ALP SERPL-CCNC: 107 U/L (ref 30–99)
ALT SERPL-CCNC: 40 U/L (ref 2–50)
ANION GAP SERPL CALC-SCNC: 10 MMOL/L (ref 7–16)
AST SERPL-CCNC: 48 U/L (ref 12–45)
BASOPHILS # BLD AUTO: 1.1 % (ref 0–1.8)
BASOPHILS # BLD: 0.04 K/UL (ref 0–0.12)
BILIRUB SERPL-MCNC: 0.2 MG/DL (ref 0.1–1.5)
BUN SERPL-MCNC: 34 MG/DL (ref 8–22)
CALCIUM ALBUM COR SERPL-MCNC: 9 MG/DL (ref 8.5–10.5)
CALCIUM SERPL-MCNC: 8.7 MG/DL (ref 8.4–10.2)
CHLORIDE SERPL-SCNC: 96 MMOL/L (ref 96–112)
CO2 SERPL-SCNC: 25 MMOL/L (ref 20–33)
CREAT SERPL-MCNC: 0.61 MG/DL (ref 0.5–1.4)
EOSINOPHIL # BLD AUTO: 0.26 K/UL (ref 0–0.51)
EOSINOPHIL NFR BLD: 7.4 % (ref 0–6.9)
ERYTHROCYTE [DISTWIDTH] IN BLOOD BY AUTOMATED COUNT: 43 FL (ref 35.9–50)
GFR SERPLBLD CREATININE-BSD FMLA CKD-EPI: 106 ML/MIN/1.73 M 2
GLOBULIN SER CALC-MCNC: 4 G/DL (ref 1.9–3.5)
GLUCOSE SERPL-MCNC: 87 MG/DL (ref 65–99)
HCT VFR BLD AUTO: 33.3 % (ref 37–47)
HGB BLD-MCNC: 11.1 G/DL (ref 12–16)
IMM GRANULOCYTES # BLD AUTO: 0.02 K/UL (ref 0–0.11)
IMM GRANULOCYTES NFR BLD AUTO: 0.6 % (ref 0–0.9)
LYMPHOCYTES # BLD AUTO: 0.55 K/UL (ref 1–4.8)
LYMPHOCYTES NFR BLD: 15.6 % (ref 22–41)
MCH RBC QN AUTO: 29.5 PG (ref 27–33)
MCHC RBC AUTO-ENTMCNC: 33.3 G/DL (ref 32.2–35.5)
MCV RBC AUTO: 88.6 FL (ref 81.4–97.8)
MONOCYTES # BLD AUTO: 0.39 K/UL (ref 0–0.85)
MONOCYTES NFR BLD AUTO: 11.1 % (ref 0–13.4)
NEUTROPHILS # BLD AUTO: 2.26 K/UL (ref 1.82–7.42)
NEUTROPHILS NFR BLD: 64.2 % (ref 44–72)
NRBC # BLD AUTO: 0 K/UL
NRBC BLD-RTO: 0 /100 WBC (ref 0–0.2)
PLATELET # BLD AUTO: 337 K/UL (ref 164–446)
PMV BLD AUTO: 8.4 FL (ref 9–12.9)
POTASSIUM SERPL-SCNC: 4.5 MMOL/L (ref 3.6–5.5)
PROT SERPL-MCNC: 7.6 G/DL (ref 6–8.2)
RBC # BLD AUTO: 3.76 M/UL (ref 4.2–5.4)
SODIUM SERPL-SCNC: 131 MMOL/L (ref 135–145)
T4 FREE SERPL-MCNC: 0.36 NG/DL (ref 0.93–1.7)
TSH SERPL DL<=0.005 MIU/L-ACNC: 0.01 UIU/ML (ref 0.38–5.33)
WBC # BLD AUTO: 3.5 K/UL (ref 4.8–10.8)

## 2024-10-05 PROCEDURE — 86800 THYROGLOBULIN ANTIBODY: CPT

## 2024-10-05 PROCEDURE — 84432 ASSAY OF THYROGLOBULIN: CPT

## 2024-10-05 PROCEDURE — 84443 ASSAY THYROID STIM HORMONE: CPT

## 2024-10-05 PROCEDURE — 85025 COMPLETE CBC W/AUTO DIFF WBC: CPT

## 2024-10-05 PROCEDURE — 80053 COMPREHEN METABOLIC PANEL: CPT

## 2024-10-05 PROCEDURE — 36415 COLL VENOUS BLD VENIPUNCTURE: CPT

## 2024-10-05 PROCEDURE — 84439 ASSAY OF FREE THYROXINE: CPT

## 2024-10-07 LAB
THYROGLOB AB SERPL-ACNC: <0.9 IU/ML (ref 0–4)
THYROGLOB SERPL-MCNC: 4942.3 NG/ML (ref 1.3–31.8)
THYROGLOB SERPL-MCNC: ABNORMAL NG/ML (ref 1.3–31.8)

## 2025-01-21 ENCOUNTER — HOSPITAL ENCOUNTER (OUTPATIENT)
Dept: LAB | Facility: MEDICAL CENTER | Age: 55
End: 2025-01-21
Attending: INTERNAL MEDICINE
Payer: MEDICARE

## 2025-01-21 LAB
BASOPHILS # BLD AUTO: 1.3 % (ref 0–1.8)
BASOPHILS # BLD: 0.06 K/UL (ref 0–0.12)
EOSINOPHIL # BLD AUTO: 0.25 K/UL (ref 0–0.51)
EOSINOPHIL NFR BLD: 5.6 % (ref 0–6.9)
ERYTHROCYTE [DISTWIDTH] IN BLOOD BY AUTOMATED COUNT: 43.2 FL (ref 35.9–50)
HCT VFR BLD AUTO: 31 % (ref 37–47)
HGB BLD-MCNC: 10.4 G/DL (ref 12–16)
IMM GRANULOCYTES # BLD AUTO: 0.04 K/UL (ref 0–0.11)
IMM GRANULOCYTES NFR BLD AUTO: 0.9 % (ref 0–0.9)
LYMPHOCYTES # BLD AUTO: 0.62 K/UL (ref 1–4.8)
LYMPHOCYTES NFR BLD: 13.9 % (ref 22–41)
MCH RBC QN AUTO: 30.6 PG (ref 27–33)
MCHC RBC AUTO-ENTMCNC: 33.5 G/DL (ref 32.2–35.5)
MCV RBC AUTO: 91.2 FL (ref 81.4–97.8)
MONOCYTES # BLD AUTO: 0.44 K/UL (ref 0–0.85)
MONOCYTES NFR BLD AUTO: 9.8 % (ref 0–13.4)
NEUTROPHILS # BLD AUTO: 3.06 K/UL (ref 1.82–7.42)
NEUTROPHILS NFR BLD: 68.5 % (ref 44–72)
NRBC # BLD AUTO: 0 K/UL
NRBC BLD-RTO: 0 /100 WBC (ref 0–0.2)
PLATELET # BLD AUTO: 403 K/UL (ref 164–446)
PMV BLD AUTO: 8.7 FL (ref 9–12.9)
RBC # BLD AUTO: 3.4 M/UL (ref 4.2–5.4)
WBC # BLD AUTO: 4.5 K/UL (ref 4.8–10.8)

## 2025-01-21 PROCEDURE — 84439 ASSAY OF FREE THYROXINE: CPT

## 2025-01-21 PROCEDURE — 83018 HEAVY METAL QUAN EACH NES: CPT

## 2025-01-21 PROCEDURE — 36415 COLL VENOUS BLD VENIPUNCTURE: CPT

## 2025-01-21 PROCEDURE — 84443 ASSAY THYROID STIM HORMONE: CPT

## 2025-01-21 PROCEDURE — 80053 COMPREHEN METABOLIC PANEL: CPT

## 2025-01-21 PROCEDURE — 86800 THYROGLOBULIN ANTIBODY: CPT

## 2025-01-21 PROCEDURE — 86376 MICROSOMAL ANTIBODY EACH: CPT

## 2025-01-21 PROCEDURE — 85025 COMPLETE CBC W/AUTO DIFF WBC: CPT

## 2025-01-21 PROCEDURE — 84432 ASSAY OF THYROGLOBULIN: CPT

## 2025-01-21 PROCEDURE — 82570 ASSAY OF URINE CREATININE: CPT

## 2025-01-22 LAB
ALBUMIN SERPL BCP-MCNC: 4 G/DL (ref 3.2–4.9)
ALBUMIN/GLOB SERPL: 1.2 G/DL
ALP SERPL-CCNC: 89 U/L (ref 30–99)
ALT SERPL-CCNC: 50 U/L (ref 2–50)
ANION GAP SERPL CALC-SCNC: 11 MMOL/L (ref 7–16)
AST SERPL-CCNC: 74 U/L (ref 12–45)
BILIRUB SERPL-MCNC: 0.3 MG/DL (ref 0.1–1.5)
BUN SERPL-MCNC: 30 MG/DL (ref 8–22)
CALCIUM ALBUM COR SERPL-MCNC: 8.5 MG/DL (ref 8.5–10.5)
CALCIUM SERPL-MCNC: 8.5 MG/DL (ref 8.5–10.5)
CHLORIDE SERPL-SCNC: 95 MMOL/L (ref 96–112)
CO2 SERPL-SCNC: 26 MMOL/L (ref 20–33)
CREAT SERPL-MCNC: 0.58 MG/DL (ref 0.5–1.4)
CREAT UR-MCNC: 51.62 MG/DL
GFR SERPLBLD CREATININE-BSD FMLA CKD-EPI: 107 ML/MIN/1.73 M 2
GLOBULIN SER CALC-MCNC: 3.4 G/DL (ref 1.9–3.5)
GLUCOSE SERPL-MCNC: 87 MG/DL (ref 65–99)
POTASSIUM SERPL-SCNC: 4.1 MMOL/L (ref 3.6–5.5)
PROT SERPL-MCNC: 7.4 G/DL (ref 6–8.2)
SODIUM SERPL-SCNC: 132 MMOL/L (ref 135–145)
T4 FREE SERPL-MCNC: 0.82 NG/DL (ref 0.93–1.7)
THYROPEROXIDASE AB SERPL-ACNC: 33 IU/ML (ref 0–9)
TSH SERPL-ACNC: 0.04 UIU/ML (ref 0.35–5.5)

## 2025-01-23 LAB
THYROGLOB AB SERPL-ACNC: <0.9 IU/ML (ref 0–4)
THYROGLOB SERPL-MCNC: 4448.4 NG/ML (ref 1.3–31.8)
THYROGLOB SERPL-MCNC: ABNORMAL NG/ML (ref 1.3–31.8)

## 2025-01-24 LAB
COLLECT DURATION TIME SPEC: NORMAL HR
CREAT 24H UR-MCNC: 55 MG/DL
IODINE 24H UR-MCNC: 123.5 UG/L (ref 26–705)
IODINE 24H UR-MRATE: NORMAL MG/(24.H) (ref 93–1125)
IODINE/CREAT UR: 224.6 UG/G CRT (ref 35–540)
SPECIMEN VOL ?TM UR: NORMAL ML

## 2025-01-25 ENCOUNTER — HOSPITAL ENCOUNTER (OUTPATIENT)
Dept: LAB | Facility: MEDICAL CENTER | Age: 55
End: 2025-01-25
Attending: INTERNAL MEDICINE
Payer: MEDICARE

## 2025-01-25 LAB
ALBUMIN SERPL BCP-MCNC: 3.9 G/DL (ref 3.2–4.9)
ALBUMIN/GLOB SERPL: 1.1 G/DL
ALP SERPL-CCNC: 97 U/L (ref 30–99)
ALT SERPL-CCNC: 48 U/L (ref 2–50)
ANION GAP SERPL CALC-SCNC: 10 MMOL/L (ref 7–16)
AST SERPL-CCNC: 59 U/L (ref 12–45)
BASOPHILS # BLD AUTO: 2 % (ref 0–1.8)
BASOPHILS # BLD: 0.06 K/UL (ref 0–0.12)
BILIRUB SERPL-MCNC: 0.2 MG/DL (ref 0.1–1.5)
BUN SERPL-MCNC: 33 MG/DL (ref 8–22)
CALCIUM ALBUM COR SERPL-MCNC: 8.9 MG/DL (ref 8.5–10.5)
CALCIUM SERPL-MCNC: 8.8 MG/DL (ref 8.4–10.2)
CHLORIDE SERPL-SCNC: 97 MMOL/L (ref 96–112)
CO2 SERPL-SCNC: 24 MMOL/L (ref 20–33)
CREAT SERPL-MCNC: 0.64 MG/DL (ref 0.5–1.4)
CREAT UR-MCNC: 49.68 MG/DL
EOSINOPHIL # BLD AUTO: 0.31 K/UL (ref 0–0.51)
EOSINOPHIL NFR BLD: 10.2 % (ref 0–6.9)
ERYTHROCYTE [DISTWIDTH] IN BLOOD BY AUTOMATED COUNT: 42.5 FL (ref 35.9–50)
FASTING STATUS PATIENT QL REPORTED: NORMAL
GFR SERPLBLD CREATININE-BSD FMLA CKD-EPI: 104 ML/MIN/1.73 M 2
GLOBULIN SER CALC-MCNC: 3.7 G/DL (ref 1.9–3.5)
GLUCOSE SERPL-MCNC: 81 MG/DL (ref 65–99)
HCT VFR BLD AUTO: 32.9 % (ref 37–47)
HGB BLD-MCNC: 10.8 G/DL (ref 12–16)
IMM GRANULOCYTES # BLD AUTO: 0.03 K/UL (ref 0–0.11)
IMM GRANULOCYTES NFR BLD AUTO: 1 % (ref 0–0.9)
LYMPHOCYTES # BLD AUTO: 0.57 K/UL (ref 1–4.8)
LYMPHOCYTES NFR BLD: 18.8 % (ref 22–41)
MCH RBC QN AUTO: 30.1 PG (ref 27–33)
MCHC RBC AUTO-ENTMCNC: 32.8 G/DL (ref 32.2–35.5)
MCV RBC AUTO: 91.6 FL (ref 81.4–97.8)
MONOCYTES # BLD AUTO: 0.43 K/UL (ref 0–0.85)
MONOCYTES NFR BLD AUTO: 14.1 % (ref 0–13.4)
NEUTROPHILS # BLD AUTO: 1.64 K/UL (ref 1.82–7.42)
NEUTROPHILS NFR BLD: 53.9 % (ref 44–72)
NRBC # BLD AUTO: 0 K/UL
NRBC BLD-RTO: 0 /100 WBC (ref 0–0.2)
PLATELET # BLD AUTO: 409 K/UL (ref 164–446)
PMV BLD AUTO: 8.7 FL (ref 9–12.9)
POTASSIUM SERPL-SCNC: 4.4 MMOL/L (ref 3.6–5.5)
PROT SERPL-MCNC: 7.6 G/DL (ref 6–8.2)
RBC # BLD AUTO: 3.59 M/UL (ref 4.2–5.4)
SODIUM SERPL-SCNC: 131 MMOL/L (ref 135–145)
T4 FREE SERPL-MCNC: 0.98 NG/DL (ref 0.93–1.7)
TSH SERPL DL<=0.005 MIU/L-ACNC: 0.57 UIU/ML (ref 0.38–5.33)
WBC # BLD AUTO: 3 K/UL (ref 4.8–10.8)

## 2025-01-25 PROCEDURE — 83018 HEAVY METAL QUAN EACH NES: CPT

## 2025-01-25 PROCEDURE — 82570 ASSAY OF URINE CREATININE: CPT

## 2025-01-25 PROCEDURE — 36415 COLL VENOUS BLD VENIPUNCTURE: CPT

## 2025-01-25 PROCEDURE — 80053 COMPREHEN METABOLIC PANEL: CPT

## 2025-01-25 PROCEDURE — 84443 ASSAY THYROID STIM HORMONE: CPT

## 2025-01-25 PROCEDURE — 84432 ASSAY OF THYROGLOBULIN: CPT

## 2025-01-25 PROCEDURE — 86800 THYROGLOBULIN ANTIBODY: CPT

## 2025-01-25 PROCEDURE — 84439 ASSAY OF FREE THYROXINE: CPT

## 2025-01-25 PROCEDURE — 85025 COMPLETE CBC W/AUTO DIFF WBC: CPT

## 2025-01-27 LAB
THYROGLOB AB SERPL-ACNC: <0.9 IU/ML (ref 0–4)
THYROGLOB SERPL-MCNC: 5735.1 NG/ML (ref 1.3–31.8)
THYROGLOB SERPL-MCNC: ABNORMAL NG/ML (ref 1.3–31.8)

## 2025-01-29 LAB
COLLECT DURATION TIME SPEC: NORMAL HR
CREAT 24H UR-MCNC: 52 MG/DL
IODINE 24H UR-MCNC: 146.2 UG/L (ref 26–705)
IODINE 24H UR-MRATE: NORMAL MG/(24.H) (ref 93–1125)
IODINE/CREAT UR: 281.1 UG/G CRT (ref 35–540)
SPECIMEN VOL ?TM UR: NORMAL ML

## 2025-02-01 ENCOUNTER — HOSPITAL ENCOUNTER (OUTPATIENT)
Facility: MEDICAL CENTER | Age: 55
End: 2025-02-01
Attending: INTERNAL MEDICINE
Payer: MEDICARE

## 2025-02-01 LAB
ALBUMIN SERPL BCP-MCNC: 3.9 G/DL (ref 3.2–4.9)
ALBUMIN/GLOB SERPL: 1.1 G/DL
ALP SERPL-CCNC: 98 U/L (ref 30–99)
ALT SERPL-CCNC: 47 U/L (ref 2–50)
ANION GAP SERPL CALC-SCNC: 8 MMOL/L (ref 7–16)
AST SERPL-CCNC: 67 U/L (ref 12–45)
BASOPHILS # BLD AUTO: 1.2 % (ref 0–1.8)
BASOPHILS # BLD: 0.04 K/UL (ref 0–0.12)
BILIRUB SERPL-MCNC: 0.2 MG/DL (ref 0.1–1.5)
BUN SERPL-MCNC: 25 MG/DL (ref 8–22)
CALCIUM ALBUM COR SERPL-MCNC: 8.7 MG/DL (ref 8.5–10.5)
CALCIUM SERPL-MCNC: 8.6 MG/DL (ref 8.4–10.2)
CHLORIDE SERPL-SCNC: 97 MMOL/L (ref 96–112)
CO2 SERPL-SCNC: 25 MMOL/L (ref 20–33)
CREAT SERPL-MCNC: 0.7 MG/DL (ref 0.5–1.4)
CREAT UR-MCNC: 45 MG/DL
EOSINOPHIL # BLD AUTO: 0.29 K/UL (ref 0–0.51)
EOSINOPHIL NFR BLD: 9 % (ref 0–6.9)
ERYTHROCYTE [DISTWIDTH] IN BLOOD BY AUTOMATED COUNT: 43.7 FL (ref 35.9–50)
GFR SERPLBLD CREATININE-BSD FMLA CKD-EPI: 102 ML/MIN/1.73 M 2
GLOBULIN SER CALC-MCNC: 3.6 G/DL (ref 1.9–3.5)
GLUCOSE SERPL-MCNC: 65 MG/DL (ref 65–99)
HCT VFR BLD AUTO: 34.6 % (ref 37–47)
HGB BLD-MCNC: 11.2 G/DL (ref 12–16)
IMM GRANULOCYTES # BLD AUTO: 0.03 K/UL (ref 0–0.11)
IMM GRANULOCYTES NFR BLD AUTO: 0.9 % (ref 0–0.9)
LYMPHOCYTES # BLD AUTO: 0.4 K/UL (ref 1–4.8)
LYMPHOCYTES NFR BLD: 12.4 % (ref 22–41)
MCH RBC QN AUTO: 30 PG (ref 27–33)
MCHC RBC AUTO-ENTMCNC: 32.4 G/DL (ref 32.2–35.5)
MCV RBC AUTO: 92.8 FL (ref 81.4–97.8)
MONOCYTES # BLD AUTO: 0.44 K/UL (ref 0–0.85)
MONOCYTES NFR BLD AUTO: 13.6 % (ref 0–13.4)
NEUTROPHILS # BLD AUTO: 2.03 K/UL (ref 1.82–7.42)
NEUTROPHILS NFR BLD: 62.9 % (ref 44–72)
NRBC # BLD AUTO: 0 K/UL
NRBC BLD-RTO: 0 /100 WBC (ref 0–0.2)
PLATELET # BLD AUTO: 388 K/UL (ref 164–446)
PMV BLD AUTO: 9 FL (ref 9–12.9)
POTASSIUM SERPL-SCNC: 4.4 MMOL/L (ref 3.6–5.5)
PROT SERPL-MCNC: 7.5 G/DL (ref 6–8.2)
RBC # BLD AUTO: 3.73 M/UL (ref 4.2–5.4)
SODIUM SERPL-SCNC: 130 MMOL/L (ref 135–145)
T4 FREE SERPL-MCNC: 1.02 NG/DL (ref 0.93–1.7)
TSH SERPL DL<=0.005 MIU/L-ACNC: 1.39 UIU/ML (ref 0.38–5.33)
WBC # BLD AUTO: 3.2 K/UL (ref 4.8–10.8)

## 2025-02-01 PROCEDURE — 36415 COLL VENOUS BLD VENIPUNCTURE: CPT

## 2025-02-01 PROCEDURE — 83018 HEAVY METAL QUAN EACH NES: CPT

## 2025-02-01 PROCEDURE — 82570 ASSAY OF URINE CREATININE: CPT

## 2025-02-01 PROCEDURE — 85025 COMPLETE CBC W/AUTO DIFF WBC: CPT

## 2025-02-01 PROCEDURE — 84432 ASSAY OF THYROGLOBULIN: CPT

## 2025-02-01 PROCEDURE — 86800 THYROGLOBULIN ANTIBODY: CPT

## 2025-02-01 PROCEDURE — 84439 ASSAY OF FREE THYROXINE: CPT

## 2025-02-01 PROCEDURE — 84443 ASSAY THYROID STIM HORMONE: CPT

## 2025-02-01 PROCEDURE — 80053 COMPREHEN METABOLIC PANEL: CPT

## 2025-02-04 LAB
COLLECT DURATION TIME SPEC: NORMAL HR
CREAT 24H UR-MCNC: 45 MG/DL
IODINE 24H UR-MCNC: 183.6 UG/L (ref 26–705)
IODINE 24H UR-MRATE: NORMAL MG/(24.H) (ref 93–1125)
IODINE/CREAT UR: 408 UG/G CRT (ref 35–540)
SPECIMEN VOL ?TM UR: NORMAL ML
THYROGLOB AB SERPL-ACNC: <0.9 IU/ML (ref 0–4)
THYROGLOB SERPL-MCNC: 7213.5 NG/ML (ref 1.3–31.8)
THYROGLOB SERPL-MCNC: ABNORMAL NG/ML (ref 1.3–31.8)

## 2025-02-08 ENCOUNTER — HOSPITAL ENCOUNTER (OUTPATIENT)
Facility: MEDICAL CENTER | Age: 55
End: 2025-02-08
Attending: INTERNAL MEDICINE
Payer: MEDICARE

## 2025-02-08 LAB
ALBUMIN SERPL BCP-MCNC: 3.8 G/DL (ref 3.2–4.9)
ALBUMIN/GLOB SERPL: 1 G/DL
ALP SERPL-CCNC: 91 U/L (ref 30–99)
ALT SERPL-CCNC: 52 U/L (ref 2–50)
ANION GAP SERPL CALC-SCNC: 11 MMOL/L (ref 7–16)
AST SERPL-CCNC: 78 U/L (ref 12–45)
BASOPHILS # BLD AUTO: 1.3 % (ref 0–1.8)
BASOPHILS # BLD: 0.05 K/UL (ref 0–0.12)
BILIRUB SERPL-MCNC: 0.2 MG/DL (ref 0.1–1.5)
BUN SERPL-MCNC: 33 MG/DL (ref 8–22)
CALCIUM ALBUM COR SERPL-MCNC: 9.2 MG/DL (ref 8.5–10.5)
CALCIUM SERPL-MCNC: 9 MG/DL (ref 8.5–10.5)
CHLORIDE SERPL-SCNC: 97 MMOL/L (ref 96–112)
CO2 SERPL-SCNC: 22 MMOL/L (ref 20–33)
CREAT SERPL-MCNC: 0.69 MG/DL (ref 0.5–1.4)
CREAT UR-MCNC: 44.6 MG/DL
EOSINOPHIL # BLD AUTO: 0.32 K/UL (ref 0–0.51)
EOSINOPHIL NFR BLD: 8.3 % (ref 0–6.9)
ERYTHROCYTE [DISTWIDTH] IN BLOOD BY AUTOMATED COUNT: 42.4 FL (ref 35.9–50)
ESTRADIOL SERPL-MCNC: 51.3 PG/ML
GFR SERPLBLD CREATININE-BSD FMLA CKD-EPI: 102 ML/MIN/1.73 M 2
GLOBULIN SER CALC-MCNC: 3.7 G/DL (ref 1.9–3.5)
GLUCOSE SERPL-MCNC: 83 MG/DL (ref 65–99)
HCT VFR BLD AUTO: 34.1 % (ref 37–47)
HGB BLD-MCNC: 11 G/DL (ref 12–16)
IMM GRANULOCYTES # BLD AUTO: 0.03 K/UL (ref 0–0.11)
IMM GRANULOCYTES NFR BLD AUTO: 0.8 % (ref 0–0.9)
LYMPHOCYTES # BLD AUTO: 0.5 K/UL (ref 1–4.8)
LYMPHOCYTES NFR BLD: 13 % (ref 22–41)
MCH RBC QN AUTO: 29.5 PG (ref 27–33)
MCHC RBC AUTO-ENTMCNC: 32.3 G/DL (ref 32.2–35.5)
MCV RBC AUTO: 91.4 FL (ref 81.4–97.8)
MONOCYTES # BLD AUTO: 0.38 K/UL (ref 0–0.85)
MONOCYTES NFR BLD AUTO: 9.9 % (ref 0–13.4)
NEUTROPHILS # BLD AUTO: 2.56 K/UL (ref 1.82–7.42)
NEUTROPHILS NFR BLD: 66.7 % (ref 44–72)
NRBC # BLD AUTO: 0 K/UL
NRBC BLD-RTO: 0 /100 WBC (ref 0–0.2)
PLATELET # BLD AUTO: 434 K/UL (ref 164–446)
PMV BLD AUTO: 8.8 FL (ref 9–12.9)
POTASSIUM SERPL-SCNC: 4.4 MMOL/L (ref 3.6–5.5)
PROT SERPL-MCNC: 7.5 G/DL (ref 6–8.2)
RBC # BLD AUTO: 3.73 M/UL (ref 4.2–5.4)
SODIUM SERPL-SCNC: 130 MMOL/L (ref 135–145)
T4 FREE SERPL-MCNC: 1.23 NG/DL (ref 0.93–1.7)
TSH SERPL-ACNC: 1.06 UIU/ML (ref 0.35–5.5)
WBC # BLD AUTO: 3.8 K/UL (ref 4.8–10.8)

## 2025-02-08 PROCEDURE — 82570 ASSAY OF URINE CREATININE: CPT

## 2025-02-08 PROCEDURE — 80053 COMPREHEN METABOLIC PANEL: CPT

## 2025-02-08 PROCEDURE — 82670 ASSAY OF TOTAL ESTRADIOL: CPT

## 2025-02-08 PROCEDURE — 85025 COMPLETE CBC W/AUTO DIFF WBC: CPT

## 2025-02-08 PROCEDURE — 84270 ASSAY OF SEX HORMONE GLOBUL: CPT

## 2025-02-08 PROCEDURE — 84432 ASSAY OF THYROGLOBULIN: CPT

## 2025-02-08 PROCEDURE — 84403 ASSAY OF TOTAL TESTOSTERONE: CPT

## 2025-02-08 PROCEDURE — 83018 HEAVY METAL QUAN EACH NES: CPT

## 2025-02-08 PROCEDURE — 84402 ASSAY OF FREE TESTOSTERONE: CPT

## 2025-02-08 PROCEDURE — 36415 COLL VENOUS BLD VENIPUNCTURE: CPT

## 2025-02-08 PROCEDURE — 86800 THYROGLOBULIN ANTIBODY: CPT

## 2025-02-08 PROCEDURE — 84439 ASSAY OF FREE THYROXINE: CPT

## 2025-02-08 PROCEDURE — 84443 ASSAY THYROID STIM HORMONE: CPT

## 2025-02-10 LAB
THYROGLOB AB SERPL-ACNC: <0.9 IU/ML (ref 0–4)
THYROGLOB SERPL-MCNC: 7431.9 NG/ML (ref 1.3–31.8)
THYROGLOB SERPL-MCNC: ABNORMAL NG/ML (ref 1.3–31.8)

## 2025-02-12 LAB
COLLECT DURATION TIME SPEC: NORMAL HR
CREAT 24H UR-MCNC: 47 MG/DL
IODINE 24H UR-MCNC: 132 UG/L (ref 26–705)
IODINE 24H UR-MRATE: NORMAL MG/(24.H) (ref 93–1125)
IODINE/CREAT UR: 280.9 UG/G CRT (ref 35–540)
SHBG SERPL-SCNC: 91 NMOL/L (ref 17–125)
SPECIMEN VOL ?TM UR: NORMAL ML
TESTOST FREE SERPL-MCNC: 5.8 PG/ML (ref 0.6–3.8)
TESTOST SERPL-MCNC: 68 NG/DL (ref 9–55)

## 2025-05-15 ENCOUNTER — HOSPITAL ENCOUNTER (OUTPATIENT)
Facility: MEDICAL CENTER | Age: 55
End: 2025-05-15
Attending: INTERNAL MEDICINE
Payer: MEDICARE

## 2025-05-15 LAB
ALBUMIN SERPL BCP-MCNC: 3.8 G/DL (ref 3.2–4.9)
ALBUMIN/GLOB SERPL: 0.9 G/DL
ALP SERPL-CCNC: 89 U/L (ref 30–99)
ALT SERPL-CCNC: 30 U/L (ref 2–50)
ANION GAP SERPL CALC-SCNC: 10 MMOL/L (ref 7–16)
AST SERPL-CCNC: 37 U/L (ref 12–45)
BASOPHILS # BLD AUTO: 1.5 % (ref 0–1.8)
BASOPHILS # BLD: 0.06 K/UL (ref 0–0.12)
BILIRUB SERPL-MCNC: 0.4 MG/DL (ref 0.1–1.5)
BUN SERPL-MCNC: 29 MG/DL (ref 8–22)
CALCIUM ALBUM COR SERPL-MCNC: 9.3 MG/DL (ref 8.5–10.5)
CALCIUM SERPL-MCNC: 9.1 MG/DL (ref 8.5–10.5)
CHLORIDE SERPL-SCNC: 96 MMOL/L (ref 96–112)
CO2 SERPL-SCNC: 25 MMOL/L (ref 20–33)
CREAT SERPL-MCNC: 0.66 MG/DL (ref 0.5–1.4)
EOSINOPHIL # BLD AUTO: 0.5 K/UL (ref 0–0.51)
EOSINOPHIL NFR BLD: 12.8 % (ref 0–6.9)
ERYTHROCYTE [DISTWIDTH] IN BLOOD BY AUTOMATED COUNT: 52.4 FL (ref 35.9–50)
FASTING STATUS PATIENT QL REPORTED: NORMAL
FERRITIN SERPL-MCNC: 62.4 NG/ML (ref 10–291)
FOLATE SERPL-MCNC: 20.4 NG/ML
GFR SERPLBLD CREATININE-BSD FMLA CKD-EPI: 103 ML/MIN/1.73 M 2
GLOBULIN SER CALC-MCNC: 4.2 G/DL (ref 1.9–3.5)
GLUCOSE SERPL-MCNC: 104 MG/DL (ref 65–99)
HCT VFR BLD AUTO: 33.9 % (ref 37–47)
HGB BLD-MCNC: 11.1 G/DL (ref 12–16)
IMM GRANULOCYTES # BLD AUTO: 0.03 K/UL (ref 0–0.11)
IMM GRANULOCYTES NFR BLD AUTO: 0.8 % (ref 0–0.9)
LYMPHOCYTES # BLD AUTO: 0.39 K/UL (ref 1–4.8)
LYMPHOCYTES NFR BLD: 10 % (ref 22–41)
MCH RBC QN AUTO: 28.4 PG (ref 27–33)
MCHC RBC AUTO-ENTMCNC: 32.7 G/DL (ref 32.2–35.5)
MCV RBC AUTO: 86.7 FL (ref 81.4–97.8)
MONOCYTES # BLD AUTO: 0.43 K/UL (ref 0–0.85)
MONOCYTES NFR BLD AUTO: 11 % (ref 0–13.4)
NEUTROPHILS # BLD AUTO: 2.49 K/UL (ref 1.82–7.42)
NEUTROPHILS NFR BLD: 63.9 % (ref 44–72)
NRBC # BLD AUTO: 0 K/UL
NRBC BLD-RTO: 0 /100 WBC (ref 0–0.2)
PLATELET # BLD AUTO: 412 K/UL (ref 164–446)
PMV BLD AUTO: 8.4 FL (ref 9–12.9)
POTASSIUM SERPL-SCNC: 4.6 MMOL/L (ref 3.6–5.5)
PROT SERPL-MCNC: 8 G/DL (ref 6–8.2)
RBC # BLD AUTO: 3.91 M/UL (ref 4.2–5.4)
SODIUM SERPL-SCNC: 131 MMOL/L (ref 135–145)
VIT B12 SERPL-MCNC: 1016 PG/ML (ref 211–911)
WBC # BLD AUTO: 3.9 K/UL (ref 4.8–10.8)

## 2025-05-15 PROCEDURE — 82607 VITAMIN B-12: CPT

## 2025-05-15 PROCEDURE — 80053 COMPREHEN METABOLIC PANEL: CPT

## 2025-05-15 PROCEDURE — 36415 COLL VENOUS BLD VENIPUNCTURE: CPT

## 2025-05-15 PROCEDURE — 82746 ASSAY OF FOLIC ACID SERUM: CPT

## 2025-05-15 PROCEDURE — 85025 COMPLETE CBC W/AUTO DIFF WBC: CPT

## 2025-05-15 PROCEDURE — 82728 ASSAY OF FERRITIN: CPT

## (undated) DEVICE — CATH VA INTR 14FRX14CM KIT LATEX 612613

## (undated) DEVICE — COVER CAMERA IN-LIGHT DISP LT-C02

## (undated) DEVICE — SU SILK 0 TIE 6X30" A306H

## (undated) DEVICE — PREP CHLORAPREP 26ML TINTED ORANGE  260815

## (undated) DEVICE — LINEN TOWEL PACK X5 5464

## (undated) DEVICE — CLIP APPLIER ENDO 5MM M/L LIGAMAX EL5ML

## (undated) DEVICE — SU ETHILON 2-0 FS 18" 664H

## (undated) DEVICE — DRSG TELFA 3X8" 1238

## (undated) DEVICE — SU VICRYL 3-0 SH 27" J316H

## (undated) DEVICE — ADHESIVE SWIFTSET 0.8ML OCTYL SS6

## (undated) DEVICE — LIGHT HANDLE X1 31140133

## (undated) DEVICE — SU VICRYL 4-0 PS-2 18" UND J496H

## (undated) DEVICE — SU VICRYL 3-0 SH 27" UND J416H

## (undated) DEVICE — SYR 10ML LL W/O NDL 302995

## (undated) DEVICE — SU DERMABOND ADVANCED .7ML DNX12

## (undated) DEVICE — LINEN GOWN XLG 5407

## (undated) DEVICE — WIPES FOLEY CARE SURESTEP PROVON DFC100

## (undated) DEVICE — ESU ELEC BLADE 2.75" COATED/INSULATED E1455

## (undated) DEVICE — PACK POST OP HEART

## (undated) DEVICE — TUBING PRESSURE W/MALE TO FEMALE CONNECTOR 24" 50P124

## (undated) DEVICE — ESU LIGASURE MARYLAND LAPAROSCOPIC SLR/DVDR 5MMX37CM LF1937

## (undated) DEVICE — APPLICATOR COTTON TIP 6"X2 STERILE LF 6012

## (undated) DEVICE — CATH TRAY FOLEY SURESTEP 16FR W/URNE MTR STLK LATEX A303316A

## (undated) DEVICE — CLIP HORIZON SM RED WIDE SLOT 001201

## (undated) DEVICE — SPONGE TONSIL W/STRING MED 23275-680

## (undated) DEVICE — ESU GROUND PAD ADULT W/CORD E7507

## (undated) DEVICE — SU SILK 3-0 TIE 12X30" A304H

## (undated) DEVICE — DRAPE IOBAN INCISE 23X17" 6650EZ

## (undated) DEVICE — SPONGE KITTNER 30-101

## (undated) DEVICE — SU MONOCRYL 4-0 PS-2 27" UND Y426H

## (undated) DEVICE — DRAIN PENROSE 1/4X18" LATEX

## (undated) DEVICE — SU VICRYL 2-0 SH 27" J317H

## (undated) DEVICE — CLIP HORIZON MED BLUE 002200

## (undated) RX ORDER — HYDROMORPHONE HYDROCHLORIDE 1 MG/ML
INJECTION, SOLUTION INTRAMUSCULAR; INTRAVENOUS; SUBCUTANEOUS
Status: DISPENSED
Start: 2018-11-06

## (undated) RX ORDER — SODIUM CHLORIDE, SODIUM LACTATE, POTASSIUM CHLORIDE, CALCIUM CHLORIDE 600; 310; 30; 20 MG/100ML; MG/100ML; MG/100ML; MG/100ML
INJECTION, SOLUTION INTRAVENOUS
Status: DISPENSED
Start: 2018-11-06

## (undated) RX ORDER — LIDOCAINE HYDROCHLORIDE 20 MG/ML
INJECTION, SOLUTION EPIDURAL; INFILTRATION; INTRACAUDAL; PERINEURAL
Status: DISPENSED
Start: 2018-11-06

## (undated) RX ORDER — EPHEDRINE SULFATE 50 MG/ML
INJECTION, SOLUTION INTRAMUSCULAR; INTRAVENOUS; SUBCUTANEOUS
Status: DISPENSED
Start: 2018-11-06

## (undated) RX ORDER — FENTANYL CITRATE 50 UG/ML
INJECTION, SOLUTION INTRAMUSCULAR; INTRAVENOUS
Status: DISPENSED
Start: 2018-11-06

## (undated) RX ORDER — LIDOCAINE HYDROCHLORIDE 40 MG/ML
INJECTION, SOLUTION RETROBULBAR
Status: DISPENSED
Start: 2018-11-06